# Patient Record
Sex: MALE | NOT HISPANIC OR LATINO
[De-identification: names, ages, dates, MRNs, and addresses within clinical notes are randomized per-mention and may not be internally consistent; named-entity substitution may affect disease eponyms.]

---

## 2018-12-08 PROBLEM — Z00.00 ENCOUNTER FOR PREVENTIVE HEALTH EXAMINATION: Status: ACTIVE | Noted: 2018-12-08

## 2019-01-03 ENCOUNTER — RECORD ABSTRACTING (OUTPATIENT)
Age: 69
End: 2019-01-03

## 2019-01-03 DIAGNOSIS — Z86.39 PERSONAL HISTORY OF OTHER ENDOCRINE, NUTRITIONAL AND METABOLIC DISEASE: ICD-10-CM

## 2019-01-03 DIAGNOSIS — Z83.3 FAMILY HISTORY OF DIABETES MELLITUS: ICD-10-CM

## 2019-01-03 DIAGNOSIS — Z82.49 FAMILY HISTORY OF ISCHEMIC HEART DISEASE AND OTHER DISEASES OF THE CIRCULATORY SYSTEM: ICD-10-CM

## 2019-01-03 DIAGNOSIS — Z82.3 FAMILY HISTORY OF STROKE: ICD-10-CM

## 2019-01-07 ENCOUNTER — APPOINTMENT (OUTPATIENT)
Dept: INTERNAL MEDICINE | Facility: CLINIC | Age: 69
End: 2019-01-07
Payer: MEDICARE

## 2019-01-07 VITALS
BODY MASS INDEX: 25.76 KG/M2 | HEART RATE: 68 BPM | SYSTOLIC BLOOD PRESSURE: 100 MMHG | WEIGHT: 170 LBS | DIASTOLIC BLOOD PRESSURE: 60 MMHG | HEIGHT: 68 IN

## 2019-01-07 DIAGNOSIS — Z78.9 OTHER SPECIFIED HEALTH STATUS: ICD-10-CM

## 2019-01-07 PROCEDURE — 99214 OFFICE O/P EST MOD 30 MIN: CPT | Mod: 25

## 2019-01-07 PROCEDURE — 36415 COLL VENOUS BLD VENIPUNCTURE: CPT

## 2019-01-07 NOTE — ASSESSMENT
[FreeTextEntry1] : Type 2 diabetes mellitus without complications - E11.9 (Primary), Check glu and A1c. Urged to increase efforts at weight loss. Ophtho and foot care discussed. Cont meds discussed with patient he will likely need higher doses of his medications.. Last A1c was better 9.4 but not at goal. He will not have seen endo in 12 months and still has no f/u appt scheduled. Urged f/u as he is not at goal. I discussed with him that his nocturia is likely a result of his high sugar.\par \par Mixed hyperlipidemia - E78.2, Check lipid profile and lft's. Cont meds and low chol diet. Urged to increase physical activity and increase efforts at wt loss. Last LDL was at goal 59\par \par Lisinopril I believe it was started for DM, not htn.\par \par name mild anemia noted last visit. Will check labs but again recommended screening colonoscopy\par \par I strongly recommended he have a screening colonoscopy as he has not had one for "quite some time".\par

## 2019-01-07 NOTE — REVIEW OF SYSTEMS
[Nocturia] : nocturia [Frequency] : frequency [Fever] : no fever [Chills] : no chills [Chest Pain] : no chest pain [Palpitations] : no palpitations [Shortness Of Breath] : no shortness of breath [Cough] : no cough

## 2019-01-07 NOTE — PHYSICAL EXAM
[No Acute Distress] : no acute distress [Well Nourished] : well nourished [Well Developed] : well developed [Well-Appearing] : well-appearing [Normal Sclera/Conjunctiva] : normal sclera/conjunctiva [PERRL] : pupils equal round and reactive to light [EOMI] : extraocular movements intact [Normal Outer Ear/Nose] : the outer ears and nose were normal in appearance [Normal Oropharynx] : the oropharynx was normal [No JVD] : no jugular venous distention [Supple] : supple [No Lymphadenopathy] : no lymphadenopathy [No Respiratory Distress] : no respiratory distress  [Clear to Auscultation] : lungs were clear to auscultation bilaterally [No Accessory Muscle Use] : no accessory muscle use [Normal Rate] : normal rate  [Regular Rhythm] : with a regular rhythm [Normal S1, S2] : normal S1 and S2 [No Murmur] : no murmur heard [No Carotid Bruits] : no carotid bruits [No Varicosities] : no varicosities [No Edema] : there was no peripheral edema [No Extremity Clubbing/Cyanosis] : no extremity clubbing/cyanosis [Soft] : abdomen soft [Non Tender] : non-tender [Non-distended] : non-distended [No HSM] : no HSM [Normal Bowel Sounds] : normal bowel sounds [Normal Posterior Cervical Nodes] : no posterior cervical lymphadenopathy [Normal Anterior Cervical Nodes] : no anterior cervical lymphadenopathy [No CVA Tenderness] : no CVA  tenderness [No Spinal Tenderness] : no spinal tenderness [No Joint Swelling] : no joint swelling [Grossly Normal Strength/Tone] : grossly normal strength/tone [No Rash] : no rash [Normal Gait] : normal gait [Coordination Grossly Intact] : coordination grossly intact [No Focal Deficits] : no focal deficits [Deep Tendon Reflexes (DTR)] : deep tendon reflexes were 2+ and symmetric [Normal Affect] : the affect was normal [Normal Insight/Judgement] : insight and judgment were intact [de-identified] : no xanthelasma

## 2019-01-07 NOTE — HISTORY OF PRESENT ILLNESS
[FreeTextEntry1] : Moved back to [place] [address] [address] and [place]\par here for followup of diabetes and hyperlipidemia\par Still has not seen endo [de-identified] : c/o Non Insulin Dependent Diabetes Mellitus. \par      Denies : Patient denies polyuria, polydipsia and polyphagia . \par      The patient has been diagnosed with diabetes since 1995. The frequency of the monitoring schedule is occasionally. Hypoglycemic episodes are none known. The results of the last Hbg A1c are 9.4 Oct, 9.3 July, 11.3 Mar 2018. Side effects of the medications include none . Compliance with the medical regimen has been good . \par      c/o Hypercholesterolemia Last LDL 59 Oct \par Patient denies myalgias. \par      The lipid profile goals for the patient are LDL less than 100 if not 70mg/dl. Dietary modifications have included reduced fat intake . Exercise modifications have included participation in walking . Response to the medications has been fair. The patient's weight has changed by -7.\par      Signs of abuse or neglect? No. Fall Risk/History of Falling No.

## 2019-01-08 LAB
ALBUMIN SERPL ELPH-MCNC: 4.7 G/DL
ALP BLD-CCNC: 61 U/L
ALT SERPL-CCNC: 26 U/L
ANION GAP SERPL CALC-SCNC: 14 MMOL/L
AST SERPL-CCNC: 43 U/L
BILIRUB SERPL-MCNC: 0.4 MG/DL
BUN SERPL-MCNC: 28 MG/DL
CALCIUM SERPL-MCNC: 9.6 MG/DL
CHLORIDE SERPL-SCNC: 105 MMOL/L
CHOLEST SERPL-MCNC: 118 MG/DL
CHOLEST/HDLC SERPL: 2.7 RATIO
CK SERPL-CCNC: 68 U/L
CO2 SERPL-SCNC: 25 MMOL/L
CREAT SERPL-MCNC: 0.83 MG/DL
FOLATE SERPL-MCNC: >20 NG/ML
GLUCOSE SERPL-MCNC: 127 MG/DL
HBA1C MFR BLD HPLC: 8.4 %
HDLC SERPL-MCNC: 43 MG/DL
IRON SATN MFR SERPL: 18 %
IRON SERPL-MCNC: 65 UG/DL
LDLC SERPL CALC-MCNC: 62 MG/DL
POTASSIUM SERPL-SCNC: 4.6 MMOL/L
PROT SERPL-MCNC: 7 G/DL
SODIUM SERPL-SCNC: 144 MMOL/L
TIBC SERPL-MCNC: 361 UG/DL
TRIGL SERPL-MCNC: 66 MG/DL
UIBC SERPL-MCNC: 296 UG/DL
VIT B12 SERPL-MCNC: 696 PG/ML

## 2019-01-15 ENCOUNTER — RX RENEWAL (OUTPATIENT)
Age: 69
End: 2019-01-15

## 2019-02-06 ENCOUNTER — RX RENEWAL (OUTPATIENT)
Age: 69
End: 2019-02-06

## 2019-02-07 ENCOUNTER — RX RENEWAL (OUTPATIENT)
Age: 69
End: 2019-02-07

## 2019-02-26 ENCOUNTER — APPOINTMENT (OUTPATIENT)
Dept: ENDOCRINOLOGY | Facility: CLINIC | Age: 69
End: 2019-02-26
Payer: MEDICARE

## 2019-02-26 VITALS
HEART RATE: 58 BPM | WEIGHT: 170 LBS | SYSTOLIC BLOOD PRESSURE: 130 MMHG | HEIGHT: 67 IN | DIASTOLIC BLOOD PRESSURE: 78 MMHG | BODY MASS INDEX: 26.68 KG/M2

## 2019-02-26 PROCEDURE — 99213 OFFICE O/P EST LOW 20 MIN: CPT

## 2019-04-04 ENCOUNTER — MEDICATION RENEWAL (OUTPATIENT)
Age: 69
End: 2019-04-04

## 2019-04-08 ENCOUNTER — APPOINTMENT (OUTPATIENT)
Dept: INTERNAL MEDICINE | Facility: CLINIC | Age: 69
End: 2019-04-08

## 2019-04-10 ENCOUNTER — TRANSCRIPTION ENCOUNTER (OUTPATIENT)
Age: 69
End: 2019-04-10

## 2019-04-15 ENCOUNTER — RX RENEWAL (OUTPATIENT)
Age: 69
End: 2019-04-15

## 2019-04-21 NOTE — PHYSICAL EXAM
[Alert] : alert [No Acute Distress] : no acute distress [Well Nourished] : well nourished [Normal Sclera/Conjunctiva] : normal sclera/conjunctiva [Well Developed] : well developed [No Proptosis] : no proptosis [EOMI] : extra ocular movement intact [Normal Oropharynx] : the oropharynx was normal [Thyroid Not Enlarged] : the thyroid was not enlarged [No Thyroid Nodules] : there were no palpable thyroid nodules [No Accessory Muscle Use] : no accessory muscle use [No Respiratory Distress] : no respiratory distress [Normal Rate] : heart rate was normal  [Normal S1, S2] : normal S1 and S2 [Clear to Auscultation] : lungs were clear to auscultation bilaterally [Regular Rhythm] : with a regular rhythm [Pedal Pulses Normal] : the pedal pulses are present [No Edema] : there was no peripheral edema [Normal Bowel Sounds] : normal bowel sounds [Soft] : abdomen soft [Not Tender] : non-tender [Post Cervical Nodes] : posterior cervical nodes [Not Distended] : not distended [Axillary Nodes] : axillary nodes [Anterior Cervical Nodes] : anterior cervical nodes [No Spinal Tenderness] : no spinal tenderness [Normal] : normal and non tender [Spine Straight] : spine straight [No Stigmata of Cushings Syndrome] : no stigmata of cushings syndrome [Normal Gait] : normal gait [Normal Strength/Tone] : muscle strength and tone were normal [No Rash] : no rash [Acanthosis Nigricans] : no acanthosis nigricans [No Tremors] : no tremors [Oriented x3] : oriented to person, place, and time [Normal Reflexes] : deep tendon reflexes were 2+ and symmetric

## 2019-04-21 NOTE — HISTORY OF PRESENT ILLNESS
[FreeTextEntry1] : February 26, 2019\par \par  PCP:   Dr. Abdi Colon\par             Eyes:   saw  [facility] opthal several months\par             GI:  recent colonoscopy - [facility]\par             \par            .\par            CC: Diabetes (~46 yo)\par \par Last A1c 8.4 %\par He would be an excellent candidate for CGM:  Freestyle Colin as he has widely fluctuating blood sugars,\par checks his fingerstick 4 times a day and injects at least 3 shots of insulin day. \par \par Previous notes from [employer] appended below.\par He was a  for [employer] and retired about a year ago.  \par As noted by Dr. Colon, he is now residing in [place].  \par ^He is testing his fingerstick blood sugars at least 4 times a day with his Accu-meter \par Taking Lantus 32 units  at bedtime and\par Humalog 3 units before breakfast and 3 units before dinner if blood glucose is over 150 mg/dl\par           Victoza 1.8  \par            Metformin 1000 BID\par He notes occasional hypoglycemia - especially before breakfast.\par He continues to note wide fluctuations in his sugars.^   \par Recent January 8, 2019 HbA1c was 8.4 % \par \par Impression:  He would be a good candidate for CGM with a Freestyle Colin meter.\par \par Plan:  Refills on Humalog insulin today.  \par Continue same dose of Lantus.  \par \par - \par \par  March 26, 2018\par            .\par            PCP: Moved from [facility] - Dr. Jackelyn Lincoln\par             Eyes \par            .\par            CC: Diabetes\par            .\par            68 yo father of five, reitred from [employer].\par            Diagnosed with Type 2 diabetes about 20 years ago.\par            Oriignally on just oral agents, then insulin added.\par            .\par            Takes Lantus 28 units HS\par            Victoaza 1.8\par            MF 1000 BID\par            Simvastatin 40\par            B12 \par            ASA 81\par            One Touch Ultraa 2\par            .\par            Impression: Apparently diabetes is currently under loose contol.\par            .\par            Plan: Test fingerstick BS 4X a day: fasting and one hour after each meal. May add short acting insulin before meals. \par            To Dermatology b/o finger lesion.\par            Will need local PCP. \par

## 2019-04-22 ENCOUNTER — RX RENEWAL (OUTPATIENT)
Age: 69
End: 2019-04-22

## 2019-04-30 ENCOUNTER — APPOINTMENT (OUTPATIENT)
Dept: INTERNAL MEDICINE | Facility: CLINIC | Age: 69
End: 2019-04-30
Payer: MEDICARE

## 2019-04-30 VITALS
DIASTOLIC BLOOD PRESSURE: 60 MMHG | HEIGHT: 67 IN | SYSTOLIC BLOOD PRESSURE: 110 MMHG | BODY MASS INDEX: 26.68 KG/M2 | WEIGHT: 170 LBS | HEART RATE: 60 BPM

## 2019-04-30 PROCEDURE — 36415 COLL VENOUS BLD VENIPUNCTURE: CPT

## 2019-04-30 PROCEDURE — 99213 OFFICE O/P EST LOW 20 MIN: CPT | Mod: 25

## 2019-04-30 NOTE — HISTORY OF PRESENT ILLNESS
[FreeTextEntry1] : Moved back to [address] and [place]\par here for followup of diabetes and hyperlipidemia\par Has finally seen endo but meds were not changed pending Skin glucometer\par needs Pvax 23 in [place] [de-identified] : c/o Non Insulin Dependent Diabetes Mellitus. \par      Denies : Patient denies polyuria, polydipsia and polyphagia . \par      The patient has been diagnosed with diabetes since 1995. The frequency of the monitoring schedule is occasionally. Hypoglycemic episodes are none known. The results of the last Hbg A1c are 8.4 Jan 2019;  9.4 Oct, 9.3 July, 11.3 Mar 2018. Side effects of the medications include none . Compliance with the medical regimen has been good . \par      c/o Hypercholesterolemia Last LDL 62 Sanjeev\par Patient denies myalgias. \par      The lipid profile goals for the patient are LDL less than 100 if not 70mg/dl. Dietary modifications have included reduced fat intake . Exercise modifications have included participation in walking . Response to the medications has been fair. The patient's weight has CHANGED BY 0.\par      Signs of abuse or neglect? No. Fall Risk/History of Falling No.

## 2019-04-30 NOTE — PHYSICAL EXAM
[No Acute Distress] : no acute distress [Well Nourished] : well nourished [Well Developed] : well developed [Well-Appearing] : well-appearing [Normal Sclera/Conjunctiva] : normal sclera/conjunctiva [PERRL] : pupils equal round and reactive to light [EOMI] : extraocular movements intact [Normal Outer Ear/Nose] : the outer ears and nose were normal in appearance [Normal Oropharynx] : the oropharynx was normal [No JVD] : no jugular venous distention [Supple] : supple [No Lymphadenopathy] : no lymphadenopathy [No Respiratory Distress] : no respiratory distress  [Clear to Auscultation] : lungs were clear to auscultation bilaterally [No Accessory Muscle Use] : no accessory muscle use [Normal Rate] : normal rate  [Regular Rhythm] : with a regular rhythm [Normal S1, S2] : normal S1 and S2 [No Murmur] : no murmur heard [No Carotid Bruits] : no carotid bruits [No Varicosities] : no varicosities [No Edema] : there was no peripheral edema [No Extremity Clubbing/Cyanosis] : no extremity clubbing/cyanosis [Soft] : abdomen soft [Non Tender] : non-tender [Non-distended] : non-distended [No HSM] : no HSM [Normal Bowel Sounds] : normal bowel sounds [Normal Posterior Cervical Nodes] : no posterior cervical lymphadenopathy [Normal Anterior Cervical Nodes] : no anterior cervical lymphadenopathy [No CVA Tenderness] : no CVA  tenderness [No Spinal Tenderness] : no spinal tenderness [No Joint Swelling] : no joint swelling [Grossly Normal Strength/Tone] : grossly normal strength/tone [No Rash] : no rash [Normal Gait] : normal gait [Coordination Grossly Intact] : coordination grossly intact [No Focal Deficits] : no focal deficits [Deep Tendon Reflexes (DTR)] : deep tendon reflexes were 2+ and symmetric [Normal Affect] : the affect was normal [Normal Insight/Judgement] : insight and judgment were intact [de-identified] : no xanthelasma

## 2019-04-30 NOTE — HEALTH RISK ASSESSMENT
[Hepatitis C test offered] : Hepatitis C test offered [ColonoscopyDate] : 04/19 [ColonoscopyComments] : Recommended again to pt [HepatitisCDate] : 04/19

## 2019-04-30 NOTE — ASSESSMENT
[FreeTextEntry1] : Type 2 diabetes mellitus without complications - E11.9 (Primary), Check glu and A1c. Urged to increase efforts at weight loss. Ophtho and foot care discussed. Cont meds discussed with patient he will likely need higher doses of his medications.. Last A1c was better 9.4 but not at goal. [facility] did not change his meds but has ordered two week glucose recorder which pt has not obtained yet.\par \par Mixed hyperlipidemia - E78.2, Check lipid profile and lft's. Cont meds and low chol diet. Urged to increase physical activity and increase efforts at wt loss. Last LDL was at goal 59\par \par Lisinopril I believe it was started for DM, not htn, as bp's are welll within normal limits.\par \par name mild anemia noted last visit. Will check labs but again recommended screening colonoscopy.\par \par D/w pt Hep C testing and he consents\par \par I strongly recommended he have a screening colonoscopy as he has not had one for "quite some time".\par

## 2019-05-01 LAB
ALBUMIN SERPL ELPH-MCNC: 4.6 G/DL
ALP BLD-CCNC: 68 U/L
ALT SERPL-CCNC: 27 U/L
ANION GAP SERPL CALC-SCNC: 17 MMOL/L
AST SERPL-CCNC: 44 U/L
BASOPHILS # BLD AUTO: 0.06 K/UL
BASOPHILS NFR BLD AUTO: 0.5 %
BILIRUB SERPL-MCNC: 0.4 MG/DL
BUN SERPL-MCNC: 25 MG/DL
CALCIUM SERPL-MCNC: 10.3 MG/DL
CHLORIDE SERPL-SCNC: 98 MMOL/L
CHOLEST SERPL-MCNC: 129 MG/DL
CHOLEST/HDLC SERPL: 2.8 RATIO
CO2 SERPL-SCNC: 25 MMOL/L
CREAT SERPL-MCNC: 0.99 MG/DL
EOSINOPHIL # BLD AUTO: 0.34 K/UL
EOSINOPHIL NFR BLD AUTO: 3.1 %
ESTIMATED AVERAGE GLUCOSE: 194 MG/DL
GLUCOSE SERPL-MCNC: 229 MG/DL
HBA1C MFR BLD HPLC: 8.4 %
HCT VFR BLD CALC: 46.3 %
HDLC SERPL-MCNC: 46 MG/DL
HGB BLD-MCNC: 14.6 G/DL
IMM GRANULOCYTES NFR BLD AUTO: 0.2 %
LDLC SERPL CALC-MCNC: 48 MG/DL
LYMPHOCYTES # BLD AUTO: 2.68 K/UL
LYMPHOCYTES NFR BLD AUTO: 24.4 %
MAN DIFF?: NORMAL
MCHC RBC-ENTMCNC: 30.5 PG
MCHC RBC-ENTMCNC: 31.5 GM/DL
MCV RBC AUTO: 96.9 FL
MONOCYTES # BLD AUTO: 0.6 K/UL
MONOCYTES NFR BLD AUTO: 5.5 %
NEUTROPHILS # BLD AUTO: 7.27 K/UL
NEUTROPHILS NFR BLD AUTO: 66.3 %
PLATELET # BLD AUTO: 192 K/UL
POTASSIUM SERPL-SCNC: 5.1 MMOL/L
PROT SERPL-MCNC: 6.8 G/DL
RBC # BLD: 4.78 M/UL
RBC # FLD: 12.9 %
SODIUM SERPL-SCNC: 140 MMOL/L
TRIGL SERPL-MCNC: 174 MG/DL
WBC # FLD AUTO: 10.97 K/UL

## 2019-05-04 LAB
HCV AB SER QL: NONREACTIVE
HCV S/CO RATIO: 0.11 S/CO

## 2019-05-06 ENCOUNTER — TRANSCRIPTION ENCOUNTER (OUTPATIENT)
Age: 69
End: 2019-05-06

## 2019-05-07 ENCOUNTER — RX RENEWAL (OUTPATIENT)
Age: 69
End: 2019-05-07

## 2019-07-12 ENCOUNTER — RX RENEWAL (OUTPATIENT)
Age: 69
End: 2019-07-12

## 2019-07-15 ENCOUNTER — RX RENEWAL (OUTPATIENT)
Age: 69
End: 2019-07-15

## 2019-08-10 ENCOUNTER — RX RENEWAL (OUTPATIENT)
Age: 69
End: 2019-08-10

## 2019-08-12 ENCOUNTER — RX RENEWAL (OUTPATIENT)
Age: 69
End: 2019-08-12

## 2019-08-12 ENCOUNTER — TRANSCRIPTION ENCOUNTER (OUTPATIENT)
Age: 69
End: 2019-08-12

## 2019-08-13 ENCOUNTER — APPOINTMENT (OUTPATIENT)
Dept: ENDOCRINOLOGY | Facility: CLINIC | Age: 69
End: 2019-08-13
Payer: MEDICARE

## 2019-08-13 VITALS
HEIGHT: 67 IN | DIASTOLIC BLOOD PRESSURE: 68 MMHG | SYSTOLIC BLOOD PRESSURE: 110 MMHG | WEIGHT: 168 LBS | HEART RATE: 64 BPM | BODY MASS INDEX: 26.37 KG/M2

## 2019-08-13 PROCEDURE — 99214 OFFICE O/P EST MOD 30 MIN: CPT | Mod: 25

## 2019-08-13 PROCEDURE — 36415 COLL VENOUS BLD VENIPUNCTURE: CPT

## 2019-08-13 NOTE — HISTORY OF PRESENT ILLNESS
[FreeTextEntry1] : August 13, 2019\par \par PCP:  Dr. Abdi Colon\par           Eyes:  Dr. De\par \par \par CC:  Diabetes\par \par ^  Monitoring fingerstick BS at home at least 4 times a day and taking 3 shots of insulin.\par He notes occasional low blood sugar, particularly before breakfast.\par It is likely that his closer attention to diet and activity has decreased his insulin\par requirements.  So he will start to slowly taper the Lantus dose down from 32 units.\par \par Impression:  Based on his information, he still has wide fluctuations in blood glucose, although less so\par than previously.   He would be excellent candidate for CGM with Colin 14 and that is in the works.\par He will have A1c today.    I asked him to return in January and to see Dr. Colon in the interval.  \par \par \par \par February 26, 2019\par \par  PCP:   Dr. Abdi Colon\par             Eyes:   saw  [facility] opthal several months\par             GI:  recent colonoscopy - [facility]\par             \par            .\par            CC: Diabetes (~46 yo)\par \par Last A1c 8.4 %\par He would be an excellent candidate for CGM:  Freestyle Coiln as he has widely fluctuating blood sugars,\par checks his fingerstick 4 times a day and injects at least 3 shots of insulin day. \par \par Previous notes from [employer] appended below.\par He was a  for [employer] and retired about a year ago.  \par As noted by Dr. Colon, he is now residing in [place].  \par ^He is testing his fingerstick blood sugars at least 4 times a day with his Accu-meter \par Taking Lantus 32 units  at bedtime and   +++\par Humalog 3 units before breakfast and 3 units before dinner if blood glucose is over 150 mg/dl\par           Victoza 1.8                ++\par            Metformin 1000 BID   ++\par He notes occasional hypoglycemia - especially before breakfast.\par He continues to note wide fluctuations in his sugars.^   \par Recent January 8, 2019 HbA1c was 8.4 % \par \par Impression:  He would be a good candidate for CGM with a Freestyle Colin meter.\par \par Plan:  Refills on Humalog insulin today.  \par Continue same dose of Lantus.  \par \par - \par \par  March 26, 2018\par            .\par            PCP: Moved from [facility] - Dr. Jackelyn Lnicoln\par             Eyes \par            .\par            CC: Diabetes\par            .\par            68 yo father of five, reitred from [employer].\par            Diagnosed with Type 2 diabetes about 20 years ago.\par            Oriignally on just oral agents, then insulin added.\par            .\par            Takes Lantus 28 units HS\par            Victoaza 1.8\par            MF 1000 BID\par            Simvastatin 40\par            B12 \par            ASA 81\par            One Touch Ultraa 2\par            .\par            Impression: Apparently diabetes is currently under loose contol.\par            .\par            Plan: Test fingerstick BS 4X a day: fasting and one hour after each meal. May add short acting insulin before meals. \par            To Dermatology b/o finger lesion.\par            Will need local PCP. \par

## 2019-08-13 NOTE — ASSESSMENT
[FreeTextEntry1] : &\par Fingerstick BS results discussed.\par Use of CGM - Freestyle Colin reviewed.

## 2019-08-14 LAB
ANION GAP SERPL CALC-SCNC: 12 MMOL/L
BUN SERPL-MCNC: 18 MG/DL
CALCIUM SERPL-MCNC: 10.4 MG/DL
CHLORIDE SERPL-SCNC: 103 MMOL/L
CO2 SERPL-SCNC: 26 MMOL/L
CREAT SERPL-MCNC: 0.79 MG/DL
ESTIMATED AVERAGE GLUCOSE: 180 MG/DL
GLUCOSE SERPL-MCNC: 75 MG/DL
HBA1C MFR BLD HPLC: 7.9 %
POTASSIUM SERPL-SCNC: 5.3 MMOL/L
SODIUM SERPL-SCNC: 141 MMOL/L

## 2019-08-15 LAB
B BURGDOR AB SER-IMP: NEGATIVE
B BURGDOR IGG+IGM SER QL: 0.04 INDEX

## 2019-09-17 ENCOUNTER — TRANSCRIPTION ENCOUNTER (OUTPATIENT)
Age: 69
End: 2019-09-17

## 2019-09-19 ENCOUNTER — APPOINTMENT (OUTPATIENT)
Dept: INTERNAL MEDICINE | Facility: CLINIC | Age: 69
End: 2019-09-19
Payer: MEDICARE

## 2019-09-19 VITALS
BODY MASS INDEX: 26 KG/M2 | DIASTOLIC BLOOD PRESSURE: 60 MMHG | HEART RATE: 68 BPM | WEIGHT: 166 LBS | SYSTOLIC BLOOD PRESSURE: 150 MMHG

## 2019-09-19 PROCEDURE — 99213 OFFICE O/P EST LOW 20 MIN: CPT | Mod: 25

## 2019-09-19 PROCEDURE — 36415 COLL VENOUS BLD VENIPUNCTURE: CPT

## 2019-09-19 PROCEDURE — 90732 PPSV23 VACC 2 YRS+ SUBQ/IM: CPT

## 2019-09-19 PROCEDURE — G0444 DEPRESSION SCREEN ANNUAL: CPT | Mod: 59

## 2019-09-19 PROCEDURE — G0009: CPT

## 2019-09-19 NOTE — ASSESSMENT
[FreeTextEntry1] : Type 2 diabetes mellitus without complications - E11.9 (Primary), Check glu and A1c. Urged to increase efforts at weight loss. Ophtho and foot care discussed. Cont meds discussed with patient he will likely need higher doses of his medications. Continue f/u with endo Hellerman. Last A1c was better 7.9 but not at goal. \par \par Mixed hyperlipidemia - E78.2, Check lipid profile and lft's. Cont meds and low chol diet. Urged to increase physical activity and increase efforts at wt loss. Last LDL was at goal 48\par \par Risks/benefits of Pvax23 vaccine d/w patient and patient understands and accepts.\par \par I strongly recommended he have a screening colonoscopy as he has not had one for "quite some time".\par Also recommended Shingrix vaccine.\par

## 2019-09-19 NOTE — HISTORY OF PRESENT ILLNESS
[FreeTextEntry1] : Moved back to [address] and [place]\par here for followup of diabetes and hyperlipidemia; now using Freestyle to check his sugar up to 20 times a day!\par now exercising regularly every day\par needs Pvax 23 in [place] [de-identified] : c/o Non Insulin Dependent Diabetes Mellitus. \par      Denies : Patient denies polyuria, polydipsia and polyphagia . \par      The patient has been diagnosed with diabetes since 1995. The frequency of the monitoring schedule is occasionally. Hypoglycemic episodes are none known. The results of the last Hbg A1c are 7.9 Aug,  8.4 Jan 2019;  9.4 Oct, 9.3 July, 11.3 Mar 2018. Side effects of the medications include none . Compliance with the medical regimen has been good . \par      c/o Hypercholesterolemia Last LDL 48 May\par Patient denies myalgias. \par      The lipid profile goals for the patient are LDL less than 100 if not 70mg/dl. Dietary modifications have included reduced fat intake . Exercise modifications have included participation in walking . Response to the medications has been fair. The patient's weight has CHANGED BY -2\par      Signs of abuse or neglect? No. Fall Risk/History of Falling No.

## 2019-09-19 NOTE — PHYSICAL EXAM
[No JVD] : no jugular venous distention [Normal] : normal rate, regular rhythm, normal S1 and S2 and no murmur heard [No Carotid Bruits] : no carotid bruits [No Edema] : there was no peripheral edema [de-identified] : no xanthelasmas [de-identified] : no S4

## 2019-09-20 LAB
ALBUMIN SERPL ELPH-MCNC: 4.8 G/DL
ALP BLD-CCNC: 55 U/L
ALT SERPL-CCNC: 28 U/L
ANION GAP SERPL CALC-SCNC: 14 MMOL/L
AST SERPL-CCNC: 51 U/L
BILIRUB SERPL-MCNC: 0.4 MG/DL
BUN SERPL-MCNC: 27 MG/DL
CALCIUM SERPL-MCNC: 9.9 MG/DL
CHLORIDE SERPL-SCNC: 105 MMOL/L
CHOLEST SERPL-MCNC: 116 MG/DL
CHOLEST/HDLC SERPL: 2.4 RATIO
CO2 SERPL-SCNC: 25 MMOL/L
CREAT SERPL-MCNC: 1 MG/DL
GLUCOSE SERPL-MCNC: 163 MG/DL
HDLC SERPL-MCNC: 48 MG/DL
LDLC SERPL CALC-MCNC: 47 MG/DL
POTASSIUM SERPL-SCNC: 5 MMOL/L
PROT SERPL-MCNC: 6.7 G/DL
SODIUM SERPL-SCNC: 144 MMOL/L
TRIGL SERPL-MCNC: 104 MG/DL

## 2019-11-14 ENCOUNTER — RX RENEWAL (OUTPATIENT)
Age: 69
End: 2019-11-14

## 2019-11-14 ENCOUNTER — TRANSCRIPTION ENCOUNTER (OUTPATIENT)
Age: 69
End: 2019-11-14

## 2019-12-03 ENCOUNTER — APPOINTMENT (OUTPATIENT)
Dept: INTERNAL MEDICINE | Facility: CLINIC | Age: 69
End: 2019-12-03
Payer: MEDICARE

## 2019-12-03 VITALS
SYSTOLIC BLOOD PRESSURE: 114 MMHG | DIASTOLIC BLOOD PRESSURE: 50 MMHG | BODY MASS INDEX: 25.37 KG/M2 | HEART RATE: 64 BPM | WEIGHT: 162 LBS

## 2019-12-03 PROCEDURE — 36415 COLL VENOUS BLD VENIPUNCTURE: CPT

## 2019-12-03 PROCEDURE — G0008: CPT

## 2019-12-03 PROCEDURE — 90653 IIV ADJUVANT VACCINE IM: CPT

## 2019-12-03 PROCEDURE — 99213 OFFICE O/P EST LOW 20 MIN: CPT | Mod: 25

## 2019-12-03 PROCEDURE — G0444 DEPRESSION SCREEN ANNUAL: CPT | Mod: 59

## 2019-12-03 NOTE — ASSESSMENT
[FreeTextEntry1] : Type 2 diabetes mellitus without complications - E11.9 (Primary), Check glu and A1c. Urged to increase efforts at weight loss. Ophtho and foot care discussed. Cont meds discussed with patient he will likely need higher doses of his medications. Continue f/u with endo Hellerman. Last A1c was better 7.9 but not at goal. \par \par Mixed hyperlipidemia - E78.2, Check lipid profile and lft's. Cont meds and low chol diet. Urged to increase physical activity and increase efforts at wt loss. Last LDL was at goal 47\par \par Risks/benefits of high-dose flu vaccine d/w patient and patient understands and accepts.\par \par I strongly recommended he have a screening colonoscopy as he has not had one for "quite some time".\par

## 2019-12-03 NOTE — REVIEW OF SYSTEMS
[Frequency] : frequency [Nocturia] : nocturia [Fever] : no fever [Chills] : no chills [Chest Pain] : no chest pain [Palpitations] : no palpitations [Shortness Of Breath] : no shortness of breath [Cough] : no cough

## 2019-12-03 NOTE — HISTORY OF PRESENT ILLNESS
[FreeTextEntry1] : Moved back to [address] and [place]\par here for followup of diabetes and hyperlipidemia; now using Freestyle to check his sugar up to 20 times a day!\par now exercising regularly every day for one hour\par self-decreased Lantus to 16 units\par \par needs Pvax 23 in [place] [de-identified] : c/o Non Insulin Dependent Diabetes Mellitus. \par      Denies : Patient denies polyuria, polydipsia and polyphagia.   The patient's weight has CHANGED BY -4\par      The patient has been diagnosed with diabetes since 1995. The frequency of the monitoring schedule is occasionally. Hypoglycemic episodes are none known. The results of the last Hbg A1c are 7.9 Aug,  8.4 Jan 2019;  9.4 Oct, 9.3 July, 11.3 Mar 2018. Side effects of the medications include none . Compliance with the medical regimen has been good . \par      c/o Hypercholesterolemia Last LDL 47 Sept\par Patient denies myalgias. \par      The lipid profile goals for the patient are LDL less than 100 if not 70mg/dl. Dietary modifications have included reduced fat intake . Exercise modifications have included participation in walking . Response to the medications has been fair.\par      Signs of abuse or neglect? No. Fall Risk/History of Falling No.

## 2019-12-03 NOTE — PHYSICAL EXAM
[No JVD] : no jugular venous distention [No Edema] : there was no peripheral edema [No Carotid Bruits] : no carotid bruits [Normal] : normal rate, regular rhythm, normal S1 and S2 and no murmur heard [de-identified] : no xanthelasmas [de-identified] : no S4

## 2019-12-04 LAB
ALBUMIN SERPL ELPH-MCNC: 4.5 G/DL
ALP BLD-CCNC: 54 U/L
ALT SERPL-CCNC: 25 U/L
ANION GAP SERPL CALC-SCNC: 14 MMOL/L
AST SERPL-CCNC: 47 U/L
BILIRUB SERPL-MCNC: 0.4 MG/DL
BUN SERPL-MCNC: 18 MG/DL
CALCIUM SERPL-MCNC: 9.7 MG/DL
CHLORIDE SERPL-SCNC: 102 MMOL/L
CHOLEST SERPL-MCNC: 112 MG/DL
CHOLEST/HDLC SERPL: 2.3 RATIO
CO2 SERPL-SCNC: 24 MMOL/L
CREAT SERPL-MCNC: 0.87 MG/DL
ESTIMATED AVERAGE GLUCOSE: 163 MG/DL
GLUCOSE SERPL-MCNC: 235 MG/DL
HBA1C MFR BLD HPLC: 7.3 %
HDLC SERPL-MCNC: 48 MG/DL
LDLC SERPL CALC-MCNC: 40 MG/DL
POTASSIUM SERPL-SCNC: 4.9 MMOL/L
PROT SERPL-MCNC: 6.5 G/DL
SODIUM SERPL-SCNC: 140 MMOL/L
TRIGL SERPL-MCNC: 118 MG/DL

## 2019-12-05 ENCOUNTER — RESULT REVIEW (OUTPATIENT)
Age: 69
End: 2019-12-05

## 2019-12-17 ENCOUNTER — RX RENEWAL (OUTPATIENT)
Age: 69
End: 2019-12-17

## 2019-12-18 ENCOUNTER — APPOINTMENT (OUTPATIENT)
Dept: INTERNAL MEDICINE | Facility: CLINIC | Age: 69
End: 2019-12-18

## 2020-01-15 ENCOUNTER — APPOINTMENT (OUTPATIENT)
Dept: ENDOCRINOLOGY | Facility: CLINIC | Age: 70
End: 2020-01-15
Payer: MEDICARE

## 2020-01-15 VITALS
BODY MASS INDEX: 25.9 KG/M2 | SYSTOLIC BLOOD PRESSURE: 118 MMHG | HEART RATE: 68 BPM | HEIGHT: 67 IN | DIASTOLIC BLOOD PRESSURE: 70 MMHG | WEIGHT: 165 LBS

## 2020-01-15 PROCEDURE — 99214 OFFICE O/P EST MOD 30 MIN: CPT

## 2020-02-13 ENCOUNTER — TRANSCRIPTION ENCOUNTER (OUTPATIENT)
Age: 70
End: 2020-02-13

## 2020-02-25 NOTE — PHYSICAL EXAM
[No Acute Distress] : no acute distress [Alert] : alert [Well Developed] : well developed [Well Nourished] : well nourished [Normal Voice/Communication] : normal voice communication [Healthy Appearance] : healthy appearance [PERRL] : pupils equal, round and reactive to light [EOMI] : extra ocular movement intact [No Proptosis] : no proptosis [No Lid Lag] : no lid lag [Normal Oropharynx] : the oropharynx was normal [Thyroid Not Enlarged] : the thyroid was not enlarged [No Thyroid Nodules] : there were no palpable thyroid nodules [No Respiratory Distress] : no respiratory distress [Normal Rate and Effort] : normal respiratory rhythm and effort [No Accessory Muscle Use] : no accessory muscle use [Normal S1, S2] : normal S1 and S2 [Normal Rate] : heart rate was normal  [Regular Rhythm] : with a regular rhythm [No Edema] : there was no peripheral edema [No Stigmata of Cushings Syndrome] : no stigmata of cushings syndrome [Spine Straight] : spine straight [Normal Gait] : normal gait [No Involuntary Movements] : no involuntary movements were seen [No Tremors] : no tremors [Oriented x3] : oriented to person, place, and time [Normal Affect] : the affect was normal [Normal Insight/Judgement] : insight and judgment were intact [Normal Mood] : the mood was normal [Acanthosis Nigricans] : no acanthosis nigricans

## 2020-02-25 NOTE — ASSESSMENT
[FreeTextEntry1] : ~\par Trajectory of blood glucose and A1c is in good direction.\par Reviewed strategies to blunt rise of blood glucose after meals even more than has\par now been achieved.

## 2020-02-25 NOTE — HISTORY OF PRESENT ILLNESS
[FreeTextEntry1] :  Sanjeev 15, 2020       Allscripts broken\par \par PCP:  Dr. Abdi Colon\par           Eyes:  Dr. De - needs to see\par           GI: [facility] - saw about a year ago\par \par \par CC:  Diabetes (~age 47)\par \par December 3, 2019  A1c 7.3 % (had been 8.4 in April 2019)\par Monitoring blood sugars at least 4X a day.\par Using Colin 14 CGM which he reports has been very helpful.\par Data reviewed still shows some spikes after meals\par He has been able to lower dose of Lantus from 32 units in PM, now down to about\par 16 units.\par Also remains on Victoza.\par ^Humalog at least twice a day before meals also assists in blunting rise in sugar.^\par ^Injects insulin 3X a day.^\par \par Impression:  Has good understanding of needed strategies to continue to control diabetes.\par Plans to see ophthalmology.\par ROV in May or June\par \par \par \par August 13, 2019\par \par PCP:  Dr. Abdi Colon\par           Eyes:  Dr. De\par \par \par CC:  Diabetes\par \par ^  Monitoring fingerstick BS at home at least 4 times a day and taking 3 shots of insulin.\par He notes occasional low blood sugar, particularly before breakfast.\par It is likely that his closer attention to diet and activity has decreased his insulin\par requirements.  So he will start to slowly taper the Lantus dose down from 32 units.\par \par Impression:  Based on his information, he still has wide fluctuations in blood glucose, although less so\par than previously.   He would be excellent candidate for CGM with Colin 14 and that is in the works.\par He will have A1c today.    I asked him to return in January and to see Dr. Colon in the interval.  \par \par \par \par February 26, 2019\par \par  PCP:   Dr. Abdi Colon\par             Eyes:   saw  [facility] opthal several months\par             GI:  recent colonoscopy - [facility]\par             \par            .\par            CC: Diabetes (~48 yo)\par \par Last A1c 8.4 %\par He would be an excellent candidate for CGM:  Freestyle Colin as he has widely fluctuating blood sugars,\par checks his fingerstick 4 times a day and injects at least 3 shots of insulin day. \par \par Previous notes from [employer] appended below.\par He was a  for [employer] and retired about a year ago.  \par As noted by Dr. Colon, he is now residing in [place].  \par ^He is testing his fingerstick blood sugars at least 4 times a day with his Accu-meter \par Taking Lantus 32 units  at bedtime and   +++\par Humalog 3 units before breakfast and 3 units before dinner if blood glucose is over 150 mg/dl\par           Victoza 1.8                ++\par            Metformin 1000 BID   ++\par He notes occasional hypoglycemia - especially before breakfast.\par He continues to note wide fluctuations in his sugars.^   \par Recent January 8, 2019 HbA1c was 8.4 % \par \par Impression:  He would be a good candidate for CGM with a Freestyle Colin meter.\par \par Plan:  Refills on Humalog insulin today.  \par Continue same dose of Lantus.  \par \par - \par \par  March 26, 2018\par            .\par            PCP: Moved from [facility] - Dr. 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 May add short acting insulin before meals. \par            To Dermatology b/o finger lesion.\par            Will need local PCP. \par

## 2020-03-02 ENCOUNTER — TRANSCRIPTION ENCOUNTER (OUTPATIENT)
Age: 70
End: 2020-03-02

## 2020-03-04 ENCOUNTER — APPOINTMENT (OUTPATIENT)
Dept: INTERNAL MEDICINE | Facility: CLINIC | Age: 70
End: 2020-03-04

## 2020-03-26 RX ORDER — PEN NEEDLE, DIABETIC 29 G X1/2"
32G X 4 MM NEEDLE, DISPOSABLE MISCELLANEOUS
Qty: 200 | Refills: 3 | Status: ACTIVE | COMMUNITY
Start: 2020-03-26 | End: 1900-01-01

## 2020-06-09 ENCOUNTER — RX RENEWAL (OUTPATIENT)
Age: 70
End: 2020-06-09

## 2020-07-12 ENCOUNTER — RX RENEWAL (OUTPATIENT)
Age: 70
End: 2020-07-12

## 2020-10-22 ENCOUNTER — APPOINTMENT (OUTPATIENT)
Dept: INTERNAL MEDICINE | Facility: CLINIC | Age: 70
End: 2020-10-22
Payer: MEDICARE

## 2020-10-22 VITALS
WEIGHT: 156 LBS | HEART RATE: 66 BPM | SYSTOLIC BLOOD PRESSURE: 120 MMHG | HEIGHT: 67 IN | BODY MASS INDEX: 24.48 KG/M2 | DIASTOLIC BLOOD PRESSURE: 80 MMHG

## 2020-10-22 PROCEDURE — G0442 ANNUAL ALCOHOL SCREEN 15 MIN: CPT | Mod: 59

## 2020-10-22 PROCEDURE — 36415 COLL VENOUS BLD VENIPUNCTURE: CPT

## 2020-10-22 PROCEDURE — 93000 ELECTROCARDIOGRAM COMPLETE: CPT | Mod: 59

## 2020-10-22 PROCEDURE — 99215 OFFICE O/P EST HI 40 MIN: CPT | Mod: 25

## 2020-10-22 NOTE — REVIEW OF SYSTEMS
[Negative] : Respiratory [Fever] : no fever [Chills] : no chills [Hearing Loss] : no hearing loss [Chest Pain] : no chest pain [Palpitations] : no palpitations [Abdominal Pain] : no abdominal pain [Nausea] : no nausea [Vomiting] : no vomiting [Heartburn] : no heartburn [Dysuria] : no dysuria [Hematuria] : no hematuria [Muscle Weakness] : no muscle weakness [Skin Rash] : no skin rash [Headache] : no headache [Dizziness] : no dizziness [Memory Loss] : no memory loss [Anxiety] : no anxiety [Depression] : no depression [Easy Bleeding] : no easy bleeding [FreeTextEntry3] : last eye exam 2 yrs [FreeTextEntry8] : no disease; + stone long ago

## 2020-10-22 NOTE — HEALTH RISK ASSESSMENT
[No] : In the past 12 months have you used drugs other than those required for medical reasons? No [No falls in past year] : Patient reported no falls in the past year [Audit-CScore] : 0

## 2020-10-22 NOTE — HISTORY OF PRESENT ILLNESS
[FreeTextEntry1] : here for cpe and f/u of DM, chol; very non-compliant with f/u, meds, care\par has not had labs checked in 10 months; stopped lisinopril and simvastatin on own; has not had DM eye exam in >2 yrs\par  [de-identified] : losing weight in covid era\par c/o Non Insulin Dependent Diabetes Mellitus. \par      Denies : Patient denies polyuria, polydipsia and polyphagia.   The patient's weight has CHANGED BY -9\par      The patient has been diagnosed with diabetes since 1995. The frequency of the monitoring schedule is occasionally. Hypoglycemic episodes are none known. The results of the last Hbg A1c are 7.3 Dec, 7.9 Aug,  8.4 Jan 2019;  9.4 Oct, 9.3 July, 11.3 Mar 2018. Side effects of the medications include none . Compliance with the medical regimen has been good . \par      c/o Hypercholesterolemia Last LDL 40 Dec\par Patient denies myalgias. \par      The lipid profile goals for the patient are LDL less than 100 if not 70mg/dl. Dietary modifications have included reduced fat intake . Exercise modifications have included participation in walking . Response to the medications has been fair.\par      Signs of abuse or neglect? No. Fall Risk/History of Falling No.

## 2020-10-22 NOTE — PHYSICAL EXAM
[No Acute Distress] : no acute distress [Well Nourished] : well nourished [Well Developed] : well developed [Well-Appearing] : well-appearing [Normal Sclera/Conjunctiva] : normal sclera/conjunctiva [PERRL] : pupils equal round and reactive to light [EOMI] : extraocular movements intact [Normal Outer Ear/Nose] : the outer ears and nose were normal in appearance [Normal Oropharynx] : the oropharynx was normal [No JVD] : no jugular venous distention [No Lymphadenopathy] : no lymphadenopathy [Supple] : supple [Thyroid Normal, No Nodules] : the thyroid was normal and there were no nodules present [No Respiratory Distress] : no respiratory distress  [No Accessory Muscle Use] : no accessory muscle use [Clear to Auscultation] : lungs were clear to auscultation bilaterally [Normal Rate] : normal rate  [Regular Rhythm] : with a regular rhythm [Normal S1, S2] : normal S1 and S2 [No Murmur] : no murmur heard [No Carotid Bruits] : no carotid bruits [No Abdominal Bruit] : a ~M bruit was not heard ~T in the abdomen [No Varicosities] : no varicosities [Pedal Pulses Present] : the pedal pulses are present [No Edema] : there was no peripheral edema [No Palpable Aorta] : no palpable aorta [No Extremity Clubbing/Cyanosis] : no extremity clubbing/cyanosis [Soft] : abdomen soft [Non Tender] : non-tender [Non-distended] : non-distended [No Masses] : no abdominal mass palpated [No HSM] : no HSM [Normal Bowel Sounds] : normal bowel sounds [Normal Posterior Cervical Nodes] : no posterior cervical lymphadenopathy [Normal Anterior Cervical Nodes] : no anterior cervical lymphadenopathy [No CVA Tenderness] : no CVA  tenderness [No Spinal Tenderness] : no spinal tenderness [No Joint Swelling] : no joint swelling [Grossly Normal Strength/Tone] : grossly normal strength/tone [No Rash] : no rash [Coordination Grossly Intact] : coordination grossly intact [No Focal Deficits] : no focal deficits [Normal Gait] : normal gait [Normal Affect] : the affect was normal [Normal Insight/Judgement] : insight and judgment were intact [de-identified] : no S4 [de-identified] : + reducible ventral hernia [de-identified] : nnormal color vision; 1+ DTRs

## 2020-10-22 NOTE — ASSESSMENT
[FreeTextEntry1] : Type 2 diabetes mellitus without complications - E11.9 (Primary), Check glu and A1c. Urged to increase efforts at weight loss. Ophtho and foot care discussed AGAIN. Cont meds discussed with patient he will likely need higher doses of his medications. Continue f/u with endo Hellerman. Last A1c was better 7.3 but not at goal. \par \par Mixed hyperlipidemia - E78.2, Check lipid profile and lft's. Cont meds and low chol diet. Urged to increase physical activity and increase efforts at wt loss. Last LDL was at goal 40\par \par Got flu vaccine about 10 days\par \par \par

## 2020-10-24 LAB
ALBUMIN SERPL ELPH-MCNC: 4.4 G/DL
ALP BLD-CCNC: 69 U/L
ALT SERPL-CCNC: 24 U/L
ANION GAP SERPL CALC-SCNC: 14 MMOL/L
APPEARANCE: CLEAR
AST SERPL-CCNC: 47 U/L
BASOPHILS # BLD AUTO: 0.05 K/UL
BASOPHILS NFR BLD AUTO: 0.6 %
BILIRUB SERPL-MCNC: 0.2 MG/DL
BILIRUBIN URINE: NEGATIVE
BLOOD URINE: NEGATIVE
BUN SERPL-MCNC: 23 MG/DL
CALCIUM SERPL-MCNC: 10 MG/DL
CHLORIDE SERPL-SCNC: 102 MMOL/L
CHOLEST SERPL-MCNC: 190 MG/DL
CO2 SERPL-SCNC: 27 MMOL/L
COLOR: YELLOW
CREAT SERPL-MCNC: 0.86 MG/DL
EOSINOPHIL # BLD AUTO: 0.55 K/UL
EOSINOPHIL NFR BLD AUTO: 6.1 %
ESTIMATED AVERAGE GLUCOSE: 157 MG/DL
GLUCOSE QUALITATIVE U: NEGATIVE
GLUCOSE SERPL-MCNC: 204 MG/DL
HBA1C MFR BLD HPLC: 7.1 %
HCT VFR BLD CALC: 41.1 %
HDLC SERPL-MCNC: 68 MG/DL
HGB BLD-MCNC: 13.4 G/DL
IMM GRANULOCYTES NFR BLD AUTO: 0.2 %
KETONES URINE: NEGATIVE
LDLC SERPL CALC-MCNC: 96 MG/DL
LEUKOCYTE ESTERASE URINE: NEGATIVE
LYMPHOCYTES # BLD AUTO: 2.3 K/UL
LYMPHOCYTES NFR BLD AUTO: 25.4 %
MAN DIFF?: NORMAL
MCHC RBC-ENTMCNC: 31.3 PG
MCHC RBC-ENTMCNC: 32.6 GM/DL
MCV RBC AUTO: 96 FL
MONOCYTES # BLD AUTO: 0.6 K/UL
MONOCYTES NFR BLD AUTO: 6.6 %
NEUTROPHILS # BLD AUTO: 5.54 K/UL
NEUTROPHILS NFR BLD AUTO: 61.1 %
NITRITE URINE: NEGATIVE
NONHDLC SERPL-MCNC: 122 MG/DL
PH URINE: 6
PLATELET # BLD AUTO: 164 K/UL
POTASSIUM SERPL-SCNC: 4.6 MMOL/L
PROT SERPL-MCNC: 6.6 G/DL
PROTEIN URINE: NEGATIVE
RBC # BLD: 4.28 M/UL
RBC # FLD: 12.8 %
SODIUM SERPL-SCNC: 143 MMOL/L
SPECIFIC GRAVITY URINE: 1.02
TRIGL SERPL-MCNC: 131 MG/DL
UROBILINOGEN URINE: NORMAL
WBC # FLD AUTO: 9.06 K/UL

## 2020-10-26 ENCOUNTER — NON-APPOINTMENT (OUTPATIENT)
Age: 70
End: 2020-10-26

## 2020-10-26 ENCOUNTER — APPOINTMENT (OUTPATIENT)
Dept: INTERNAL MEDICINE | Facility: CLINIC | Age: 70
End: 2020-10-26

## 2020-11-03 ENCOUNTER — APPOINTMENT (OUTPATIENT)
Dept: ENDOCRINOLOGY | Facility: CLINIC | Age: 70
End: 2020-11-03
Payer: MEDICARE

## 2020-11-03 PROCEDURE — 99214 OFFICE O/P EST MOD 30 MIN: CPT | Mod: 95

## 2020-11-03 NOTE — ASSESSMENT
[FreeTextEntry1] : Blood sugar control is improving.\par Striving for A1c under 7 % per ADA guidelines.

## 2020-11-03 NOTE — HISTORY OF PRESENT ILLNESS
[Home] : at home, [unfilled] , at the time of the visit. [Medical Office: ([facility])___] : at the medical office located in  [Verbal consent obtained from patient] : the patient, [unfilled] [FreeTextEntry1] : Nov 03, 2020       Android    [employer]\par \par  PCP:  Dr. Abdi Colon\par           Eyes:  Dr. De - needs to see\par           GI: [facility] - saw about a year ago\par \par \par CC:  Diabetes (~age 47)\par \par Was out of town  and A1c went down to 7.1 % from 7.3 in Dec 2019, 7.9 in Augu 2019 and 8.4 in April 2019\par He attributes much of the improvement to multiple tests he can do with Freestyle Colin 14, more than 4X a day.\par \par Has been able to lower Lantus to 12 units a day HS.\par Humalog by sliding scale. 2- 3 times a day, none if glucose   \par \par Impression:  BS at visit to Dr. Colon was over 200 mg/dl after a sandwich; however, A1c trajectory is improving.\par \par Plan:   Same Rx and ROV in February 2021.        \par \par \par \par \par \par Sanjeev 15, 2020       Allscripts broken\par \par PCP:  Dr. Abdi Colon\par           Eyes:  Dr. De - needs to see\par           GI: [facility] - saw about a year ago\par \par \par CC:  Diabetes (~age 47)\par \par December 3, 2019  A1c 7.3 % (had been 8.4 in April 2019)\par Monitoring blood sugars at least 4X a day.\par Using Colin 14 CGM which he reports has been very helpful.\par Data reviewed still shows some spikes after meals\par He has been able to lower dose of Lantus from 32 units in PM, now down to about\par 16 units.\par Also remains on Victoza.\par ^Humalog at least twice a day before meals also assists in blunting rise in sugar.^\par ^Injects insulin 3X a day.^\par \par Impression:  Has good understanding of needed strategies to continue to control diabetes.\par Plans to see ophthalmology.\par ROV in May or June\par \par \par \par August 13, 2019\par \par PCP:  Dr. Abdi Colon\par           Eyes:  Dr. De\par \par \par CC:  Diabetes\par \par ^  Monitoring fingerstick BS at home at least 4 times a day and taking 3 shots of insulin.\par He notes occasional low blood sugar, particularly before breakfast.\par It is likely that his closer attention to diet and activity has decreased his insulin\par requirements.  So he will start to slowly taper the Lantus dose down from 32 units.\par \par Impression:  Based on his information, he still has wide fluctuations in blood glucose, although less so\par than previously.   He would be excellent candidate for CGM with Colin 14 and that is in the works.\par He will have A1c today.    I asked him to return in January and to see Dr. Colon in the interval.  \par \par \par \par February 26, 2019\par \par  PCP:   Dr. Abdi Colon\par             Eyes:   saw  [facility] opthal several months\par             GI:  recent colonoscopy - [facility]\par             \par            .\par            CC: Diabetes (~48 yo)\par \par Last A1c 8.4 %\par He would be an excellent candidate for CGM:  Freestyle Colin as he has widely fluctuating blood sugars,\par checks his fingerstick 4 times a day and injects at least 3 shots of insulin day. \par \par Previous notes from [employer] appended below.\par He was a  for [employer] and retired about a year ago.  \par As noted by Dr. Colon, he is now residing in [place].  \par ^He is testing his fingerstick blood sugars at least 4 times a day with his Accu-meter \par Taking Lantus 32 units  at bedtime and   +++\par Humalog 3 units before breakfast and 3 units before dinner if blood glucose is over 150 mg/dl\par           Victoza 1.8                ++\par            Metformin 1000 BID   ++\par He notes occasional hypoglycemia - especially before breakfast.\par He continues to note wide fluctuations in his sugars.^   \par Recent January 8, 2019 HbA1c was 8.4 % \par \par Impression:  He would be a good candidate for CGM with a Freestyle Colin meter.\par \par Plan:  Refills on Humalog insulin today.  \par Continue same dose of Lantus.  \par \par - \par \par  March 26, 2018\par            .\par            PCP: Moved from [facility] - Dr. 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 May add short acting insulin before meals. \par            To Dermatology b/o finger lesion.\par            Will need local PCP. \par

## 2021-01-19 ENCOUNTER — NON-APPOINTMENT (OUTPATIENT)
Age: 71
End: 2021-01-19

## 2021-01-21 ENCOUNTER — APPOINTMENT (OUTPATIENT)
Dept: INTERNAL MEDICINE | Facility: CLINIC | Age: 71
End: 2021-01-21
Payer: MEDICARE

## 2021-01-21 VITALS
HEART RATE: 68 BPM | HEIGHT: 67 IN | WEIGHT: 156 LBS | DIASTOLIC BLOOD PRESSURE: 50 MMHG | BODY MASS INDEX: 24.48 KG/M2 | SYSTOLIC BLOOD PRESSURE: 100 MMHG

## 2021-01-21 PROCEDURE — 99213 OFFICE O/P EST LOW 20 MIN: CPT | Mod: 25

## 2021-01-21 PROCEDURE — G0444 DEPRESSION SCREEN ANNUAL: CPT | Mod: 59

## 2021-01-21 PROCEDURE — 36415 COLL VENOUS BLD VENIPUNCTURE: CPT

## 2021-01-21 NOTE — ASSESSMENT
[FreeTextEntry1] : Type 2 diabetes mellitus without complications - E11.9 (Primary), Check glu and A1c. Urged to increase efforts at weight loss. Ophtho and foot care discussed AGAIN. Cont meds discussed with patient he will likely need higher doses of his medications. Continue f/u with endo Hellerman. Last A1c was better 7.1 but not at goal. \par \par Mixed hyperlipidemia - E78.2, Check lipid profile and lft's. Cont meds and low chol diet. Urged to increase physical activity and increase efforts at wt loss. Last LDL was at goal 96\par \par \par \par \par

## 2021-01-21 NOTE — PHYSICAL EXAM
[No JVD] : no jugular venous distention [Normal] : normal rate, regular rhythm, normal S1 and S2 and no murmur heard [de-identified] : no S4

## 2021-01-21 NOTE — HISTORY OF PRESENT ILLNESS
[FreeTextEntry1] : Living at [address] and [facility]\par here for followup of diabetes and hyperlipidemia; now using Freestyle to check his sugar up to 20 times a day!\par now exercising regularly every day for one hour\par got Moderna yesterday\par self-decreased Lantus to 16 units\par \par needs Pvax 23 in JU[place] [de-identified] : c/o Non Insulin Dependent Diabetes Mellitus. \par      Denies : Patient denies polyuria, polydipsia and polyphagia.   The patient's weight has CHANGED BY -9\par      The patient has been diagnosed with diabetes since 1995. The frequency of the monitoring schedule is frequently using Freestyle. Hypoglycemic episodes are none known. The results of the last Hbg A1c are 7.1 Oct, 7.3 Dec, 7.9 Aug,  8.4 Jan 2019;  9.4 Oct, 9.3 July, 11.3 Mar 2018. Side effects of the medications include none . Compliance with the medical regimen has been good . \par      c/o Hypercholesterolemia Last LDL 96 Oct\par Patient denies myalgias. \par      The lipid profile goals for the patient are LDL less than 100 if not 70mg/dl. Dietary modifications have included reduced fat intake . Exercise modifications have included participation in walking . Response to the medications has been fair.\par      Signs of abuse or neglect? No. Fall Risk/History of Falling No.

## 2021-01-21 NOTE — REVIEW OF SYSTEMS
[Fever] : no fever [Chills] : no chills [Chest Pain] : no chest pain [Palpitations] : no palpitations [Headache] : no headache [Dizziness] : no dizziness

## 2021-01-23 LAB
ALBUMIN SERPL ELPH-MCNC: 4.9 G/DL
ALP BLD-CCNC: 66 U/L
ALT SERPL-CCNC: 26 U/L
ANION GAP SERPL CALC-SCNC: 14 MMOL/L
AST SERPL-CCNC: 52 U/L
BILIRUB DIRECT SERPL-MCNC: 0.1 MG/DL
BILIRUB INDIRECT SERPL-MCNC: 0.2 MG/DL
BILIRUB SERPL-MCNC: 0.4 MG/DL
BUN SERPL-MCNC: 28 MG/DL
CALCIUM SERPL-MCNC: 9.9 MG/DL
CHLORIDE SERPL-SCNC: 103 MMOL/L
CHOLEST SERPL-MCNC: 130 MG/DL
CO2 SERPL-SCNC: 24 MMOL/L
CREAT SERPL-MCNC: 1.02 MG/DL
ESTIMATED AVERAGE GLUCOSE: 163 MG/DL
GLUCOSE SERPL-MCNC: 77 MG/DL
HBA1C MFR BLD HPLC: 7.3 %
HDLC SERPL-MCNC: 59 MG/DL
LDLC SERPL CALC-MCNC: 60 MG/DL
NONHDLC SERPL-MCNC: 72 MG/DL
POTASSIUM SERPL-SCNC: 5.6 MMOL/L
PROT SERPL-MCNC: 7.1 G/DL
SODIUM SERPL-SCNC: 141 MMOL/L
TRIGL SERPL-MCNC: 58 MG/DL

## 2021-02-11 ENCOUNTER — APPOINTMENT (OUTPATIENT)
Dept: ENDOCRINOLOGY | Facility: CLINIC | Age: 71
End: 2021-02-11
Payer: MEDICARE

## 2021-02-11 VITALS
WEIGHT: 158 LBS | OXYGEN SATURATION: 98 % | HEIGHT: 67 IN | BODY MASS INDEX: 24.8 KG/M2 | DIASTOLIC BLOOD PRESSURE: 76 MMHG | HEART RATE: 64 BPM | SYSTOLIC BLOOD PRESSURE: 110 MMHG

## 2021-02-11 PROCEDURE — 99214 OFFICE O/P EST MOD 30 MIN: CPT

## 2021-02-11 NOTE — HISTORY OF PRESENT ILLNESS
[FreeTextEntry1] : Feb 11, 2021    in person\par \par  PCP:  Dr. Abdi Colon\par           Eyes:  Dr. De - saw recently \par           GI: [facility] - up to date\par \par \par CC:  Diabetes (~age 47)\par \par Jan 23, 2021 A1c 7.3 %  (lower when he was in [place])    Now less active in [place] apartment.  \par On Victoza 1.8 daily\par Lantus 12 units HS \par metformin 1000 BID\par Humalog BID - TID AC by sliding scale\par \par Continues to find the Freestyle Colin 14 very helpful\par \par Data reviewed.   If he has chili over rice, BS goes up to 250 mg/dl\par \par Impression:  Working hard to control sugars.\par He has good understanding of relatin between his diet and resultsing sugars.\par \par Plan:  Reviewed ADA gudielines.\par Same Rx\par \par \par \par \par \par \par Nov 03, 2020       Android    [employer]\par \par  PCP:  Dr. Abdi Colon\par           Eyes:  Dr. De - needs to see\par           GI: [facility] - saw about a year ago\par \par \par CC:  Diabetes (~age 47)\par \par Was out of town  and A1c went down to 7.1 % from 7.3 in Dec 2019, 7.9 in Augu 2019 and 8.4 in April 2019\par He attributes much of the improvement to multiple tests he can do with Freestyle Colin 14, more than 4X a day.\par \par Has been able to lower Lantus to 12 units a day HS.\par Humalog by sliding scale. 2- 3 times a day, none if glucose   \par \par Impression:  BS at visit to Dr. Colon was over 200 mg/dl after a sandwich; however, A1c trajectory is improving.\par \par Plan:   Same Rx and ROV in February 2021.        \par \par \par \par \par \par Sanjeev 15, 2020       Allscripts broken\par \par PCP:  Dr. Abdi Colon\par           Eyes:  Dr. De - needs to see\par           GI: [facility] - saw about a year ago\par \par \par CC:  Diabetes (~age 47)\par \par December 3, 2019  A1c 7.3 % (had been 8.4 in April 2019)\par Monitoring blood sugars at least 4X a day.\par Using Colin 14 CGM which he reports has been very helpful.\par Data reviewed still shows some spikes after meals\par He has been able to lower dose of Lantus from 32 units in PM, now down to about\par 16 units.\par Also remains on Victoza.\par ^Humalog at least twice a day before meals also assists in blunting rise in sugar.^\par ^Injects insulin 3X a day.^\par \par Impression:  Has good understanding of needed strategies to continue to control diabetes.\par Plans to see ophthalmology.\par ROV in May or June\par \par \par \par August 13, 2019\par \par PCP:  Dr. Abdi Colon\par           Eyes:  Dr. De\par \par \par CC:  Diabetes\par \par ^  Monitoring fingerstick BS at home at least 4 times a day and taking 3 shots of insulin.\par He notes occasional low blood sugar, particularly before breakfast.\par It is likely that his closer attention to diet and activity has decreased his insulin\par requirements.  So he will start to slowly taper the Lantus dose down from 32 units.\par \par Impression:  Based on his information, he still has wide fluctuations in blood glucose, although less so\par than previously.   He would be excellent candidate for CGM with Colin 14 and that is in the works.\par He will have A1c today.    I asked him to return in January and to see Dr. Colon in the interval.  \par \par \par \par February 26, 2019\par \par  PCP:   Dr. Abdi Colon\par             Eyes:   saw  [facility] opthal several months\par             GI:  recent colonoscopy - [facility]\par             \par            .\par            CC: Diabetes (~46 yo)\par \par Last A1c 8.4 %\par He would be an excellent candidate for CGM:  Freestyle Colin as he has widely fluctuating blood sugars,\par checks his fingerstick 4 times a day and injects at least 3 shots of insulin day. \par \par Previous notes from [employer] appended below.\par He was a  for [employer] and retired about a year ago.  \par As noted by Dr. Colon, he is now residing in [place].  \par ^He is testing his fingerstick blood sugars at least 4 times a day with his Accu-meter \par Taking Lantus 32 units  at bedtime and   +++\par Humalog 3 units before breakfast and 3 units before dinner if blood glucose is over 150 mg/dl\par           Victoza 1.8                ++\par            Metformin 1000 BID   ++\par He notes occasional hypoglycemia - especially before breakfast.\par He continues to note wide fluctuations in his sugars.^   \par Recent January 8, 2019 HbA1c was 8.4 % \par \par Impression:  He would be a good candidate for CGM with a Freestyle Colin meter.\par \par Plan:  Refills on Humalog insulin today.  \par Continue same dose of Lantus.  \par \par - \par \par  March 26, 2018\par            .\par            PCP: Moved from [facility] - Dr. 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 May add short acting insulin before meals. \par            To Dermatology b/o finger lesion.\par            Will need local PCP. \par

## 2021-02-11 NOTE — ASSESSMENT
[FreeTextEntry1] : BS and A1c not yet optimized.\par ADA Guidelines advise striving for A1c under 7% and peak after meal BS under 180.\par Patient has good understanding of strategies to achieve these goals.\par He may be able to get insulin at lower cost than Lantus and may be able to\par obtain GLP-1 agonist at lower cost than Victoza and he will look into that.  \par Suggested ROV in 4-5 months\par

## 2021-04-22 ENCOUNTER — APPOINTMENT (OUTPATIENT)
Dept: INTERNAL MEDICINE | Facility: CLINIC | Age: 71
End: 2021-04-22
Payer: MEDICARE

## 2021-04-22 VITALS
BODY MASS INDEX: 24.8 KG/M2 | DIASTOLIC BLOOD PRESSURE: 60 MMHG | WEIGHT: 158 LBS | HEIGHT: 67 IN | HEART RATE: 68 BPM | SYSTOLIC BLOOD PRESSURE: 110 MMHG

## 2021-04-22 PROCEDURE — 36415 COLL VENOUS BLD VENIPUNCTURE: CPT

## 2021-04-22 PROCEDURE — 99214 OFFICE O/P EST MOD 30 MIN: CPT | Mod: 25

## 2021-04-22 NOTE — HISTORY OF PRESENT ILLNESS
[FreeTextEntry1] : Living at [address] and [facility]\par here for followup of diabetes and hyperlipidemia\par now exercising regularly every day for one hour\par c/o nocturia, frequency, weaker urinary stream\par got Moderna yesterday\par self-decreased Lantus to 16 units\par \par needs Pvax 23 in JUly [de-identified] : c/o Non Insulin Dependent Diabetes Mellitus. \par      Denies : Patient denies polyuria, polydipsia and polyphagia.   The patient's weight has CHANGED BY 0\par      The patient has been diagnosed with diabetes since 1995. The frequency of the monitoring schedule is frequently using Freestyle. Hypoglycemic episodes are none known. The results of the last Hbg A1c are 7.3 Jan 2021; 7.1 Oct, 7.3 Dec, 7.9 Aug,  8.4 Jan 2019;  9.4 Oct, 9.3 July, 11.3 Mar 2018. Side effects of the medications include NONE. Compliance with the medical regimen has been good . \par      c/o Hypercholesterolemia Last LDL 60 Sanjeev\par Patient denies myalgias. \par      The lipid profile goals for the patient are LDL less than 100 if not 70mg/dl. Dietary modifications have included reduced fat intake . Exercise modifications have included participation in walking . Response to the medications has been fair.\par      Signs of abuse or neglect? No. Fall Risk/History of Falling No.

## 2021-04-22 NOTE — ASSESSMENT
[FreeTextEntry1] : Type 2 diabetes mellitus without complications - E11.9 (Primary), Check glu and A1c. Urged to increase efforts at weight loss. Ophtho and foot care discussed AGAIN. Cont meds discussed with patient he will likely need higher doses of his medications. Continue f/u with endo Hellerman. Last A1c was NOT AT GOAL 7.3\par \par Mixed hyperlipidemia - E78.2, Check lipid profile and lft's. Cont meds and low chol diet. Urged to increase physical activity and increase efforts at wt loss. Last LDL was at goal 60. he requested I renewed his simvastatin to [facility] today.\par \par BPH with outlet obstruction likely. d/w pt at length. referred to Dr. Hart of urology.\par \par leukocytosis- it appears he normally runs a high-mirna white count. Normal differential noted. We will recheck\par \par \par \par \par

## 2021-04-22 NOTE — PHYSICAL EXAM
[No JVD] : no jugular venous distention [Normal] : normal rate, regular rhythm, normal S1 and S2 and no murmur heard [de-identified] : no S4

## 2021-04-23 ENCOUNTER — NON-APPOINTMENT (OUTPATIENT)
Age: 71
End: 2021-04-23

## 2021-04-23 LAB
ALBUMIN SERPL ELPH-MCNC: 4.5 G/DL
ALP BLD-CCNC: 59 U/L
ALT SERPL-CCNC: 24 U/L
ANION GAP SERPL CALC-SCNC: 10 MMOL/L
AST SERPL-CCNC: 45 U/L
BASOPHILS # BLD AUTO: 0.06 K/UL
BASOPHILS NFR BLD AUTO: 0.7 %
BILIRUB DIRECT SERPL-MCNC: 0.1 MG/DL
BILIRUB INDIRECT SERPL-MCNC: 0.3 MG/DL
BILIRUB SERPL-MCNC: 0.4 MG/DL
BUN SERPL-MCNC: 27 MG/DL
CALCIUM SERPL-MCNC: 9.8 MG/DL
CHLORIDE SERPL-SCNC: 103 MMOL/L
CHOLEST SERPL-MCNC: 127 MG/DL
CO2 SERPL-SCNC: 27 MMOL/L
CREAT SERPL-MCNC: 0.73 MG/DL
EOSINOPHIL # BLD AUTO: 0.56 K/UL
EOSINOPHIL NFR BLD AUTO: 6.9 %
ESTIMATED AVERAGE GLUCOSE: 157 MG/DL
GLUCOSE SERPL-MCNC: 156 MG/DL
HBA1C MFR BLD HPLC: 7.1 %
HCT VFR BLD CALC: 39.7 %
HDLC SERPL-MCNC: 51 MG/DL
HGB BLD-MCNC: 13 G/DL
IMM GRANULOCYTES NFR BLD AUTO: 0.2 %
LDLC SERPL CALC-MCNC: 58 MG/DL
LYMPHOCYTES # BLD AUTO: 2.27 K/UL
LYMPHOCYTES NFR BLD AUTO: 27.9 %
MAN DIFF?: NORMAL
MCHC RBC-ENTMCNC: 31.2 PG
MCHC RBC-ENTMCNC: 32.7 GM/DL
MCV RBC AUTO: 95.2 FL
MONOCYTES # BLD AUTO: 0.6 K/UL
MONOCYTES NFR BLD AUTO: 7.4 %
NEUTROPHILS # BLD AUTO: 4.64 K/UL
NEUTROPHILS NFR BLD AUTO: 56.9 %
NONHDLC SERPL-MCNC: 77 MG/DL
PLATELET # BLD AUTO: 165 K/UL
POTASSIUM SERPL-SCNC: 4.5 MMOL/L
PROT SERPL-MCNC: 6.8 G/DL
RBC # BLD: 4.17 M/UL
RBC # FLD: 12.3 %
SODIUM SERPL-SCNC: 140 MMOL/L
TRIGL SERPL-MCNC: 93 MG/DL
WBC # FLD AUTO: 8.15 K/UL

## 2021-07-18 ENCOUNTER — RX RENEWAL (OUTPATIENT)
Age: 71
End: 2021-07-18

## 2021-07-22 ENCOUNTER — APPOINTMENT (OUTPATIENT)
Dept: INTERNAL MEDICINE | Facility: CLINIC | Age: 71
End: 2021-07-22
Payer: MEDICARE

## 2021-07-22 VITALS
BODY MASS INDEX: 24.48 KG/M2 | WEIGHT: 156 LBS | SYSTOLIC BLOOD PRESSURE: 110 MMHG | DIASTOLIC BLOOD PRESSURE: 60 MMHG | HEART RATE: 68 BPM | HEIGHT: 67 IN

## 2021-07-22 PROCEDURE — 99214 OFFICE O/P EST MOD 30 MIN: CPT | Mod: 25

## 2021-07-22 PROCEDURE — 36415 COLL VENOUS BLD VENIPUNCTURE: CPT

## 2021-07-22 NOTE — HISTORY OF PRESENT ILLNESS
[FreeTextEntry1] : Living at [address] and [facility]\par here for followup of diabetes and hyperlipidemia\par received covid 19 vaccines but does not recall dates\par c/o nocturia, frequency, weaker urinary stream\par got Moderna yesterday\par self-decreased Lantus to 16 units\par \par needs Pvax 23 in JUly [de-identified] : c/o Non Insulin Dependent Diabetes Mellitus. \par      Denies : Patient denies polyuria, polydipsia and polyphagia.   The patient's weight has CHANGED BY -2\par      The patient has been diagnosed with diabetes since 1995. The frequency of the monitoring schedule is frequently using Freestyle. Hypoglycemic episodes are none known. The results of the last Hbg A1c are 7.1 Apr, 7.3 Sanjeev 2021; 7.1 Oct, 7.3 Dec, 7.9 Aug,  8.4 Jan 2019;  9.4 Oct, 9.3 July, 11.3 Mar 2018. Side effects of the medications include NONE. Compliance with the medical regimen has been good . \par      c/o Hypercholesterolemia Last LDL 58 Apr\par Patient denies myalgias. \par      The lipid profile goals for the patient are LDL less than 100 if not 70mg/dl. Dietary modifications have included reduced fat intake . Exercise modifications have included participation in walking . Response to the medications has been fair.\par      Signs of abuse or neglect? No. Fall Risk/History of Falling No.

## 2021-07-22 NOTE — PHYSICAL EXAM
[No JVD] : no jugular venous distention [Normal] : normal rate, regular rhythm, normal S1 and S2 and no murmur heard [de-identified] : no S4

## 2021-07-22 NOTE — ASSESSMENT
[FreeTextEntry1] : Type 2 diabetes mellitus without complications - E11.9 (Primary), Check glu and A1c. Urged to increase efforts at weight loss. Ophtho and foot care discussed AGAIN. Cont meds discussed with patient he will likely need higher doses of his medications. Continue f/u with endo Hellerman. Last A1c was ALMOST AT GOAL 7.1\par \par Mixed hyperlipidemia - E78.2, Check lipid profile and lft's. Cont meds and low chol diet. Urged to increase physical activity and increase efforts at wt loss. Last LDL was at goal 58 he requested I renewed his simvastatin to [facility] today.\par \par BPH with outlet obstruction likely. d/w pt at length. referred to Dr. Hart of urology.\par \par leukocytosis- it appears he normally runs a high-mirna white count. Normal differential noted. Last WBC normal 8.15\par \par \par \par

## 2021-08-09 ENCOUNTER — NON-APPOINTMENT (OUTPATIENT)
Age: 71
End: 2021-08-09

## 2021-08-09 LAB
ALBUMIN SERPL ELPH-MCNC: 4.6 G/DL
ALP BLD-CCNC: 54 U/L
ALT SERPL-CCNC: 24 U/L
ANION GAP SERPL CALC-SCNC: 12 MMOL/L
AST SERPL-CCNC: 50 U/L
BASOPHILS # BLD AUTO: 0.06 K/UL
BASOPHILS NFR BLD AUTO: 0.7 %
BILIRUB DIRECT SERPL-MCNC: 0.2 MG/DL
BILIRUB INDIRECT SERPL-MCNC: 0.3 MG/DL
BILIRUB SERPL-MCNC: 0.4 MG/DL
BUN SERPL-MCNC: 21 MG/DL
CALCIUM SERPL-MCNC: 10.2 MG/DL
CHLORIDE SERPL-SCNC: 100 MMOL/L
CHOLEST SERPL-MCNC: 124 MG/DL
CO2 SERPL-SCNC: 26 MMOL/L
CREAT SERPL-MCNC: 0.84 MG/DL
EOSINOPHIL # BLD AUTO: 0.39 K/UL
EOSINOPHIL NFR BLD AUTO: 4.8 %
ESTIMATED AVERAGE GLUCOSE: 148 MG/DL
GLUCOSE SERPL-MCNC: 159 MG/DL
HBA1C MFR BLD HPLC: 6.8 %
HCT VFR BLD CALC: 41.8 %
HDLC SERPL-MCNC: 59 MG/DL
HGB BLD-MCNC: 13.3 G/DL
IMM GRANULOCYTES NFR BLD AUTO: 0.2 %
LDLC SERPL CALC-MCNC: 55 MG/DL
LYMPHOCYTES # BLD AUTO: 1.92 K/UL
LYMPHOCYTES NFR BLD AUTO: 23.4 %
MAN DIFF?: NORMAL
MCHC RBC-ENTMCNC: 31.1 PG
MCHC RBC-ENTMCNC: 31.8 GM/DL
MCV RBC AUTO: 97.9 FL
MONOCYTES # BLD AUTO: 0.66 K/UL
MONOCYTES NFR BLD AUTO: 8 %
NEUTROPHILS # BLD AUTO: 5.15 K/UL
NEUTROPHILS NFR BLD AUTO: 62.9 %
NONHDLC SERPL-MCNC: 66 MG/DL
PLATELET # BLD AUTO: 176 K/UL
POTASSIUM SERPL-SCNC: 5.3 MMOL/L
PROT SERPL-MCNC: 6.7 G/DL
RBC # BLD: 4.27 M/UL
RBC # FLD: 12.9 %
SODIUM SERPL-SCNC: 139 MMOL/L
TRIGL SERPL-MCNC: 51 MG/DL
WBC # FLD AUTO: 8.2 K/UL

## 2021-09-03 ENCOUNTER — APPOINTMENT (OUTPATIENT)
Dept: ENDOCRINOLOGY | Facility: CLINIC | Age: 71
End: 2021-09-03
Payer: MEDICARE

## 2021-09-03 PROCEDURE — 99214 OFFICE O/P EST MOD 30 MIN: CPT | Mod: 95

## 2021-09-03 RX ORDER — INSULIN LISPRO 100 [IU]/ML
100 INJECTION, SOLUTION INTRAVENOUS; SUBCUTANEOUS
Qty: 2 | Refills: 1 | Status: ACTIVE | COMMUNITY
Start: 2020-11-03 | End: 1900-01-01

## 2021-09-03 RX ORDER — LIRAGLUTIDE 6 MG/ML
18 INJECTION SUBCUTANEOUS DAILY
Qty: 3 | Refills: 3 | Status: ACTIVE | COMMUNITY
Start: 1900-01-01 | End: 1900-01-01

## 2021-09-04 NOTE — HISTORY OF PRESENT ILLNESS
[Home] : at home, [unfilled] , at the time of the visit. [Medical Office: ([facility])___] : at the medical office located in  [Verbal consent obtained from patient] : the patient, [unfilled] [FreeTextEntry1] : Sep 03, 2021        Amwell TEB \par \par  PCP:  Dr. Abdi Colon\par           Eyes:  Dr. Kenisha malave recently \par           GI: [facility] - up to date\par \par \par CC:  Diabetes (~age 47)\par \par Retired . \par \par Most recent A1c levels. \par Jan 23, 2021 A1c 7.3 % \par Aug 9, 2021  A1c 6.8 %\par \par On Victoza 1.8 daily\par metformin 1000 BID\par Lantus 10- 12 units HS \par \par Humalog BID - TID AC by sliding scale:  none if sugar under 140;  3 units if BS over 140.  \par Checking sugars at least 4X a day. \par \par Impression:  \par A1c improving.\par He continues to find the Freestyle Colin 14 helpful.  \par \par Plan:  Same Rx.\par I have asked him to send me images from the Colin 14 for review. \par ROV by January 2022\par \par \par 7/22/2021  labs included 6.8 %\par \par Feb 11, 2021    in person\par \par  PCP:  Dr. Abdi Colon\par           Eyes:  Dr. Kenisha malave recently \par           GI: [facility] - up to date\par \par \par CC:  Diabetes (~age 47)\par \par Jan 23, 2021 A1c 7.3 %  (lower when he was in [place])    Now less active in [place] apartment.  \par On Victoza 1.8 daily\par Lantus 12 units HS \par metformin 1000 BID\par Humalog BID - TID AC by sliding scale\par \par Continues to find the Freestyle Colin 14 very helpful\par \par Data reviewed.   If he has chili over rice, BS goes up to 250 mg/dl\par \par Impression:  Working hard to control sugars.\par He has good understanding of relatin between his diet and resultsing sugars.\par \par Plan:  Reviewed ADA gudielines.\par Same Rx\par \par \par \par \par \par \par Nov 03, 2020       Android    Doximity\par \par  PCP:  Dr. Abdi Colon\par           Eyes:  Dr. De - needs to see\par           GI: [facility] - saw about a year ago\par \par \par CC:  Diabetes (~age 47)\par \par Was out of town  and A1c went down to 7.1 % from 7.3 in Dec 2019, 7.9 in Augu 2019 and 8.4 in April 2019\par He attributes much of the improvement to multiple tests he can do with Freestyle Colin 14, more than 4X a day.\par \par Has been able to lower Lantus to 12 units a day HS.\par Humalog by sliding scale. 2- 3 times a day, none if glucose   \par \par Impression:  BS at visit to Dr. Colon was over 200 mg/dl after a sandwich; however, A1c trajectory is improving.\par \par Plan:   Same Rx and ROV in February 2021.        \par \par \par \par \par \par Sanjeev 15, 2020       Allscripts broken\par \par PCP:  Dr. Abdi Colon\par           Eyes:  Dr. De - needs to see\par           GI: [facility] - saw about a year ago\par \par \par CC:  Diabetes (~age 47)\par \par December 3, 2019  A1c 7.3 % (had been 8.4 in April 2019)\par Monitoring blood sugars at least 4X a day.\par Using Colin 14 CGM which he reports has been very helpful.\par Data reviewed still shows some spikes after meals\par He has been able to lower dose of Lantus from 32 units in PM, now down to about\par 16 units.\par Also remains on Victoza.\par ^Humalog at least twice a day before meals also assists in blunting rise in sugar.^\par ^Injects insulin 3X a day.^\par \par Impression:  Has good understanding of needed strategies to continue to control diabetes.\par Plans to see ophthalmology.\par ROV in May or June\par \par \par \par August 13, 2019\par \par PCP:  Dr. Abdi Colon\par           Eyes:  Dr. De\par \par \par CC:  Diabetes\par \par ^  Monitoring fingerstick BS at home at least 4 times a day and taking 3 shots of insulin.\par He notes occasional low blood sugar, particularly before breakfast.\par It is likely that his closer attention to diet and activity has decreased his insulin\par requirements.  So he will start to slowly taper the Lantus dose down from 32 units.\par \par Impression:  Based on his information, he still has wide fluctuations in blood glucose, although less so\par than previously.   He would be excellent candidate for CGM with Colin 14 and that is in the works.\par He will have A1c today.    I asked him to return in January and to see Dr. Colon in the interval.  \par \par \par \par February 26, 2019\par \par  PCP:   Dr. Abdi Colon\par             Eyes:   saw  [facility] opthal several months\par             GI:  recent colonoscopy - [facility]\par             \par            .\par            CC: Diabetes (~46 yo)\par \par Last A1c 8.4 %\par He would be an excellent candidate for CGM:  Freestyle Colin as he has widely fluctuating blood sugars,\par checks his fingerstick 4 times a day and injects at least 3 shots of insulin day. \par \par Previous notes from [employer] appended below.\par He was a  for [employer] and retired about a year ago.  \par As noted by Dr. Colon, he is now residing in [place].  \par ^He is testing his fingerstick blood sugars at least 4 times a day with his Accu-meter \par Taking Lantus 32 units  at bedtime and   +++\par Humalog 3 units before breakfast and 3 units before dinner if blood glucose is over 150 mg/dl\par           Victoza 1.8                ++\par            Metformin 1000 BID   ++\par He notes occasional hypoglycemia - especially before breakfast.\par He continues to note wide fluctuations in his sugars.^   \par Recent January 8, 2019 HbA1c was 8.4 % \par \par Impression:  He would be a good candidate for CGM with a Freestyle Colin meter.\par \par Plan:  Refills on Humalog insulin today.  \par Continue same dose of Lantus.  \par \par - \par \par  March 26, 2018\par            .\par            PCP: Moved from [facility] - Dr. 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 May add short acting insulin before meals. \par            To Dermatology b/o finger lesion.\par            Will need local PCP. \par

## 2021-10-21 ENCOUNTER — APPOINTMENT (OUTPATIENT)
Dept: INTERNAL MEDICINE | Facility: CLINIC | Age: 71
End: 2021-10-21
Payer: MEDICARE

## 2021-10-21 VITALS
HEIGHT: 67 IN | BODY MASS INDEX: 23.86 KG/M2 | HEART RATE: 66 BPM | SYSTOLIC BLOOD PRESSURE: 100 MMHG | WEIGHT: 152 LBS | DIASTOLIC BLOOD PRESSURE: 50 MMHG

## 2021-10-21 PROCEDURE — 90662 IIV NO PRSV INCREASED AG IM: CPT

## 2021-10-21 PROCEDURE — 36415 COLL VENOUS BLD VENIPUNCTURE: CPT

## 2021-10-21 PROCEDURE — 99214 OFFICE O/P EST MOD 30 MIN: CPT | Mod: 25

## 2021-10-21 PROCEDURE — G0008: CPT

## 2021-10-21 NOTE — ASSESSMENT
[FreeTextEntry1] : Type 2 diabetes mellitus without complications - E11.9 (Primary), Check glu and A1c. Urged to increase efforts at weight loss. Ophtho and foot care discussed AGAIN. Cont meds discussed with patient he will likely need higher doses of his medications. Continue f/u with endo Hellerman. Last A1c was AT GOAL 6.8\par \par Mixed hyperlipidemia - E78.2, Check lipid profile and lft's. Cont meds and low chol diet. Urged to increase physical activity and increase efforts at wt loss. Last LDL was at goal 55\par \par BPH with outlet obstruction likely. Previously d/w pt at length. referred to Dr. Hart of urology.\par \par leukocytosis- it appears he normally runs a high-mirna white count. Normal differential noted. Last WBC normal 8.20\par \par Risks/benefits of flu vaccine discussed with patient and patient understands and accepts.\par \par \par

## 2021-10-21 NOTE — PHYSICAL EXAM
[No JVD] : no jugular venous distention [Normal] : normal rate, regular rhythm, normal S1 and S2 and no murmur heard [de-identified] : no S4

## 2021-10-21 NOTE — HISTORY OF PRESENT ILLNESS
[FreeTextEntry1] : Living at [address] and [facility]\par here for followup of diabetes and hyperlipidemia\par received covid 19 vaccines but does not recall dates\par c/o nocturia, frequency, weaker urinary stream\par got Moderna yesterday\par self-decreased Lantus to 16 units\par \par needs Pvax 23 in JUly [de-identified] : c/o Non Insulin Dependent Diabetes Mellitus. \par      Denies : Patient denies polyuria, polydipsia and polyphagia.   The patient's weight has CHANGED BY -2\par      The patient has been diagnosed with diabetes since 1995. The frequency of the monitoring schedule is frequently using Freestyle. Hypoglycemic episodes are none known. The results of the last Hbg A1c are 6.8 July, 7.1 Apr, 7.3 Jan 2021; 7.1 Oct, 7.3 Dec, 7.9 Aug,  8.4 Jan 2019;  9.4 Oct, 9.3 July, 11.3 Mar 2018. Side effects of the medications include NONE. Compliance with the medical regimen has been GOOD. \par      c/o Hypercholesterolemia Last LDL 55 July\par Patient denies myalgias. \par      The lipid profile goals for the patient are LDL less than 100 if not 70mg/dl. Dietary modifications have included reduced fat intake . Exercise modifications have included participation in walking . Response to the medications has been fair.\par      Signs of abuse or neglect? No. Fall Risk/History of Falling No.

## 2021-10-25 LAB
ALBUMIN SERPL ELPH-MCNC: 4.6 G/DL
ALP BLD-CCNC: 64 U/L
ALT SERPL-CCNC: 28 U/L
ANION GAP SERPL CALC-SCNC: 13 MMOL/L
AST SERPL-CCNC: 56 U/L
BASOPHILS # BLD AUTO: 0.08 K/UL
BASOPHILS NFR BLD AUTO: 1.1 %
BILIRUB DIRECT SERPL-MCNC: 0.1 MG/DL
BILIRUB INDIRECT SERPL-MCNC: 0.2 MG/DL
BILIRUB SERPL-MCNC: 0.3 MG/DL
BUN SERPL-MCNC: 30 MG/DL
CALCIUM SERPL-MCNC: 9.7 MG/DL
CHLORIDE SERPL-SCNC: 104 MMOL/L
CHOLEST SERPL-MCNC: 144 MG/DL
CO2 SERPL-SCNC: 24 MMOL/L
CREAT SERPL-MCNC: 0.89 MG/DL
EOSINOPHIL # BLD AUTO: 0.4 K/UL
EOSINOPHIL NFR BLD AUTO: 5.6 %
ESTIMATED AVERAGE GLUCOSE: 148 MG/DL
GLUCOSE SERPL-MCNC: 97 MG/DL
HBA1C MFR BLD HPLC: 6.8 %
HCT VFR BLD CALC: 38.7 %
HDLC SERPL-MCNC: 70 MG/DL
HGB BLD-MCNC: 12.7 G/DL
IMM GRANULOCYTES NFR BLD AUTO: 0.1 %
LDLC SERPL CALC-MCNC: 65 MG/DL
LYMPHOCYTES # BLD AUTO: 2.06 K/UL
LYMPHOCYTES NFR BLD AUTO: 29 %
MAN DIFF?: NORMAL
MCHC RBC-ENTMCNC: 31.4 PG
MCHC RBC-ENTMCNC: 32.8 GM/DL
MCV RBC AUTO: 95.8 FL
MONOCYTES # BLD AUTO: 0.44 K/UL
MONOCYTES NFR BLD AUTO: 6.2 %
NEUTROPHILS # BLD AUTO: 4.11 K/UL
NEUTROPHILS NFR BLD AUTO: 58 %
NONHDLC SERPL-MCNC: 74 MG/DL
PLATELET # BLD AUTO: 188 K/UL
POTASSIUM SERPL-SCNC: 5 MMOL/L
PROT SERPL-MCNC: 6.5 G/DL
RBC # BLD: 4.04 M/UL
RBC # FLD: 12.6 %
SODIUM SERPL-SCNC: 141 MMOL/L
TRIGL SERPL-MCNC: 46 MG/DL
WBC # FLD AUTO: 7.1 K/UL

## 2021-11-18 ENCOUNTER — APPOINTMENT (OUTPATIENT)
Dept: INTERNAL MEDICINE | Facility: CLINIC | Age: 71
End: 2021-11-18

## 2021-12-01 ENCOUNTER — APPOINTMENT (OUTPATIENT)
Dept: ENDOCRINOLOGY | Facility: CLINIC | Age: 71
End: 2021-12-01
Payer: MEDICARE

## 2021-12-01 PROCEDURE — 99214 OFFICE O/P EST MOD 30 MIN: CPT | Mod: 95

## 2021-12-09 ENCOUNTER — APPOINTMENT (OUTPATIENT)
Dept: INTERNAL MEDICINE | Facility: CLINIC | Age: 71
End: 2021-12-09
Payer: MEDICARE

## 2021-12-09 VITALS
WEIGHT: 150 LBS | BODY MASS INDEX: 23.54 KG/M2 | DIASTOLIC BLOOD PRESSURE: 60 MMHG | SYSTOLIC BLOOD PRESSURE: 100 MMHG | HEIGHT: 67 IN | HEART RATE: 68 BPM

## 2021-12-09 PROCEDURE — 36415 COLL VENOUS BLD VENIPUNCTURE: CPT

## 2021-12-09 PROCEDURE — 99213 OFFICE O/P EST LOW 20 MIN: CPT | Mod: 25

## 2021-12-09 NOTE — ASSESSMENT
[FreeTextEntry1] : New anemia- will recheck to verify and check iron, B12, folate studies. To heme if confirmed as doubt gi blood loss with normal colonoscopy to years ago and normal mcv.\par \par Type 2 diabetes mellitus without complications - E11.9 (Primary), Check glu and A1c. Urged to increase efforts at weight loss. Ophtho and foot care discussed AGAIN. Cont meds discussed with patient he will likely need higher doses of his medications. Continue f/u with endo Hellerman. Last A1c was AT GOAL 6.8\par \par Mixed hyperlipidemia - E78.2, Check lipid profile and lft's. Cont meds and low chol diet. Urged to increase physical activity and increase efforts at wt loss. Last LDL was at goal 65\par \par BPH with outlet obstruction likely. Previously d/w pt at length. referred to Dr. Hart of urology.\par \par leukocytosis- it appears he normally runs a high-mirna white count. Normal differential noted. Last WBC normal 7.10\par \par \par \par \par

## 2021-12-09 NOTE — PHYSICAL EXAM
[Normal] : no acute distress, well nourished, well developed and well-appearing [de-identified] : no conjunctival pallor [de-identified] : no pallor

## 2021-12-09 NOTE — HISTORY OF PRESENT ILLNESS
[FreeTextEntry1] : Living at [address] and [facility]\par Missed last appt as he called at time of visit to say he would be 20 min late\par here for followup of NEW ANEMIA\par received covid 19 vaccines but does not recall dates\par c/o nocturia, frequency, weaker urinary stream\par got Moderna yesterday\par self-decreased Lantus to 16 units\par \par needs Pvax 23 in JUly [de-identified] : c/o Non Insulin Dependent Diabetes Mellitus. \par      Denies : Patient denies polyuria, polydipsia and polyphagia.   The patient's weight has CHANGED BY -2\par      The patient has been diagnosed with diabetes since 1995. The frequency of the monitoring schedule is frequently using Freestyle. Hypoglycemic episodes are none known. The results of the last Hbg A1c are 6.8 Oct, 6.8 July, 7.1 Apr, 7.3 Sanjeev 2021; 7.1 Oct, 7.3 Dec, 7.9 Aug,  8.4 Jan 2019;  9.4 Oct, 9.3 July, 11.3 Mar 2018. Side effects of the medications include NONE. Compliance with the medical regimen has been GOOD. \par      c/o Hypercholesterolemia Last LDL 65 Oct\par Patient denies myalgias. \par      The lipid profile goals for the patient are LDL less than 100 if not 70mg/dl. Dietary modifications have included reduced fat intake . Exercise modifications have included participation in walking . Response to the medications has been fair.\par      Signs of abuse or neglect? No. Fall Risk/History of Falling No.

## 2021-12-10 NOTE — HISTORY OF PRESENT ILLNESS
[Other Location: e.g. School (Enter Location, City,State)___] : at [unfilled], at the time of the visit. [Medical Office: ([facility])___] : at the medical office located in  [Verbal consent obtained from patient] : the patient, [unfilled] [FreeTextEntry1] : Dec 01, 2021     \par \par PCP:  Dr. Abdi Colon\par           Eyes:  Dr. Kenisha malave recently \par           GI: [facility] - up to date\par \par \par CC:  Diabetes (~age 47)\par \par Retired . \par \par Most recent A1c levels. \par Jan 23, 2021 A1c 7.3 % \par Aug 9, 2021  A1c 6.8 %\par Oct 21, 2021  A1c 6.8 %\par \par Remains on:\par \par On Victoza 1.8 daily\par metformin 1000 BID\par Lantus 10- 12 units HS \par \par Humalog BID - TID AC by sliding scale:  none if sugar under 140;  3 units if BS over 140.  \par Checking sugars at least 4X a day. \par \par Needs more Colin 2 sensors.   Can put deep on his android phone.\par \par No symptomatic hypoglycemia although down to 70-80    \par \par Impression:  BS and A1c in good range on current Rx with attention to diet, activity.\par \par Plan:  Same Rx\par \par \par \par Sep 03, 2021        Amwell TEB \par \par  PCP:  Dr. Abdi Colon\par           Eyes:  Dr. Kenisha malave recently \par           GI: [facility] - up to date\par \par \par CC:  Diabetes (~age 47)\par \par Retired . \par \par Most recent A1c levels. \par Jan 23, 2021 A1c 7.3 % \par Aug 9, 2021  A1c 6.8 %\par \par On Victoza 1.8 daily\par metformin 1000 BID\par Lantus 10- 12 units HS \par \par Humalog BID - TID AC by sliding scale:  none if sugar under 140;  3 units if BS over 140.  \par Checking sugars at least 4X a day. \par \par Impression:  \par A1c improving.\par He continues to find the Freestyle Colin 14 helpful.  \par \par Plan:  Same Rx.\par I have asked him to send me images from the Colin 14 for review. \par ROV by January 2022\par \par \par 7/22/2021  labs included 6.8 %\par \par Feb 11, 2021    in person\par \par  PCP:  Dr. Abdi Colon\par           Eyes:  Dr. De - saw recently \par           GI: [facility] - up to date\par \par \par CC:  Diabetes (~age 47)\par \par Jan 23, 2021 A1c 7.3 %  (lower when he was in [place])    Now less active in [place] apartment.  \par On Victoza 1.8 daily\par Lantus 12 units HS \par metformin 1000 BID\par Humalog BID - TID AC by sliding scale\par \par Continues to find the Freestyle Colin 14 very helpful\par \par Data reviewed.   If he has chili over rice, BS goes up to 250 mg/dl\par \par Impression:  Working hard to control sugars.\par He has good understanding of relatin between his diet and resultsing sugars.\par \par Plan:  Reviewed ADA gudielines.\par Same Rx\par \par \par \par \par \par \par Nov 03, 2020       Android    D[employer]imity\par \par  PCP:  Dr. Abdi Colon\par           Eyes:  Dr. De - needs to see\par           GI: [facility] - saw about a year ago\par \par \par CC:  Diabetes (~age 47)\par \par Was out of town  and A1c went down to 7.1 % from 7.3 in Dec 2019, 7.9 in Augu 2019 and 8.4 in April 2019\par He attributes much of the improvement to multiple tests he can do with Freestyle Colin 14, more than 4X a day.\par \par Has been able to lower Lantus to 12 units a day HS.\par Humalog by sliding scale. 2- 3 times a day, none if glucose   \par \par Impression:  BS at visit to Dr. Colon was over 200 mg/dl after a sandwich; however, A1c trajectory is improving.\par \par Plan:   Same Rx and ROV in February 2021.        \par \par \par \par \par \par Sanjeev 15, 2020       Allscripts broken\par \par PCP:  Dr. Abdi Colon\par           Eyes:  Dr. Kenisha Krueger needs to see\par           GI: [facility] - saw about a year ago\par \par \par CC:  Diabetes (~age 47)\par \par December 3, 2019  A1c 7.3 % (had been 8.4 in April 2019)\par Monitoring blood sugars at least 4X a day.\par Using Colin 14 CGM which he reports has been very helpful.\par Data reviewed still shows some spikes after meals\par He has been able to lower dose of Lantus from 32 units in PM, now down to about\par 16 units.\par Also remains on Victoza.\par ^Humalog at least twice a day before meals also assists in blunting rise in sugar.^\par ^Injects insulin 3X a day.^\par \par Impression:  Has good understanding of needed strategies to continue to control diabetes.\par Plans to see ophthalmology.\par ROV in May or June\par \par \par \par August 13, 2019\par \par PCP:  Dr. Abdi Colon\par           Eyes:  Dr. De\par \par \par CC:  Diabetes\par \par ^  Monitoring fingerstick BS at home at least 4 times a day and taking 3 shots of insulin.\par He notes occasional low blood sugar, particularly before breakfast.\par It is likely that his closer attention to diet and activity has decreased his insulin\par requirements.  So he will start to slowly taper the Lantus dose down from 32 units.\par \par Impression:  Based on his information, he still has wide fluctuations in blood glucose, although less so\par than previously.   He would be excellent candidate for CGM with Colin 14 and that is in the works.\par He will have A1c today.    I asked him to return in January and to see Dr. Colon in the interval.  \par \par \par \par February 26, 2019\par \par  PCP:   Dr. Abdi Colon\par             Eyes:   saw  [facility] opthal several months\par             GI:  recent colonoscopy - [facility]\par             \par            .\par            CC: Diabetes (~48 yo)\par \par Last A1c 8.4 %\par He would be an excellent candidate for CGM:  Freestyle Colin as he has widely fluctuating blood sugars,\par checks his fingerstick 4 times a day and injects at least 3 shots of insulin day. \par \par Previous notes from [employer] appended below.\par He was a  for [employer] and retired about a year ago.  \par As noted by Dr. Colon, he is now residing in [place].  \par ^He is testing his fingerstick blood sugars at least 4 times a day with his Accu-meter \par Taking Lantus 32 units  at bedtime and   +++\par Humalog 3 units before breakfast and 3 units before dinner if blood glucose is over 150 mg/dl\par           Victoza 1.8                ++\par            Metformin 1000 BID   ++\par He notes occasional hypoglycemia - especially before breakfast.\par He continues to note wide fluctuations in his sugars.^   \par Recent January 8, 2019 HbA1c was 8.4 % \par \par Impression:  He would be a good candidate for CGM with a Freestyle Colin meter.\par \par Plan:  Refills on Humalog insulin today.  \par Continue same dose of Lantus.  \par \par - \par \par  March 26, 2018\par            .\par            PCP: Moved from [facility] - Dr. 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 May add short acting insulin before meals. \par            To Dermatology b/o finger lesion.\par            Will need local PCP. \par

## 2021-12-13 LAB
BASOPHILS # BLD AUTO: 0.05 K/UL
BASOPHILS NFR BLD AUTO: 0.6 %
EOSINOPHIL # BLD AUTO: 0.32 K/UL
EOSINOPHIL NFR BLD AUTO: 4.1 %
FOLATE SERPL-MCNC: 18.3 NG/ML
HCT VFR BLD CALC: 36.1 %
HGB BLD-MCNC: 11.9 G/DL
IMM GRANULOCYTES NFR BLD AUTO: 0.1 %
IRON SATN MFR SERPL: 27 %
IRON SERPL-MCNC: 82 UG/DL
LYMPHOCYTES # BLD AUTO: 1.99 K/UL
LYMPHOCYTES NFR BLD AUTO: 25.4 %
MAN DIFF?: NORMAL
MCHC RBC-ENTMCNC: 31.2 PG
MCHC RBC-ENTMCNC: 33 GM/DL
MCV RBC AUTO: 94.5 FL
MONOCYTES # BLD AUTO: 0.48 K/UL
MONOCYTES NFR BLD AUTO: 6.1 %
NEUTROPHILS # BLD AUTO: 4.97 K/UL
NEUTROPHILS NFR BLD AUTO: 63.7 %
PLATELET # BLD AUTO: 188 K/UL
RBC # BLD: 3.82 M/UL
RBC # FLD: 12.2 %
TIBC SERPL-MCNC: 306 UG/DL
UIBC SERPL-MCNC: 224 UG/DL
VIT B12 SERPL-MCNC: 793 PG/ML
WBC # FLD AUTO: 7.82 K/UL

## 2021-12-15 LAB — METHYLMALONATE SERPL-SCNC: 254 NMOL/L

## 2021-12-23 ENCOUNTER — NON-APPOINTMENT (OUTPATIENT)
Age: 71
End: 2021-12-23

## 2022-01-20 ENCOUNTER — APPOINTMENT (OUTPATIENT)
Dept: HEMATOLOGY ONCOLOGY | Facility: CLINIC | Age: 72
End: 2022-01-20
Payer: MEDICARE

## 2022-01-20 ENCOUNTER — RESULT REVIEW (OUTPATIENT)
Age: 72
End: 2022-01-20

## 2022-01-20 VITALS
HEART RATE: 76 BPM | TEMPERATURE: 97.4 F | SYSTOLIC BLOOD PRESSURE: 148 MMHG | OXYGEN SATURATION: 95 % | DIASTOLIC BLOOD PRESSURE: 68 MMHG | HEIGHT: 67.13 IN | RESPIRATION RATE: 16 BRPM | WEIGHT: 156.5 LBS | BODY MASS INDEX: 24.28 KG/M2

## 2022-01-20 VITALS
BODY MASS INDEX: 19.68 KG/M2 | RESPIRATION RATE: 16 BRPM | DIASTOLIC BLOOD PRESSURE: 78 MMHG | WEIGHT: 126.9 LBS | SYSTOLIC BLOOD PRESSURE: 143 MMHG | HEIGHT: 67.13 IN | TEMPERATURE: 96.7 F | OXYGEN SATURATION: 99 % | HEART RATE: 65 BPM

## 2022-01-20 PROCEDURE — 36415 COLL VENOUS BLD VENIPUNCTURE: CPT

## 2022-01-20 PROCEDURE — 99205 OFFICE O/P NEW HI 60 MIN: CPT | Mod: 25

## 2022-01-20 NOTE — HISTORY OF PRESENT ILLNESS
[de-identified] : Mr. August Moreno is 71 year old male with normocytic anemia here for consultation, referred by Dr. Duglas Correia\par \par Patient with past medical history of  type 2 diabetes who had blood work done with Dr. Colon on 12/13/21 Hgb 11.9 hct 36.1 MCV 94.5 - normal since Oct 2021\par  iron sat 27%, normal b12, folate, and MMA\par \par Colonoscopy 2019 with polyps - repeat every 5 years. \par \par FHx: Denies of family history hematological and/or oncological \par SHx: lives in [place], retired - was in technical sales. never smoked and denies alcohol intake. \par \par Received [employer] and Pfizer booster in Nov 2021\par \par He's active, volunteering with gardening and staying very active. \par

## 2022-01-20 NOTE — CONSULT LETTER
[Dear  ___] : Dear  [unfilled], [Consult Letter:] : I had the pleasure of evaluating your patient, [unfilled]. [Please see my note below.] : Please see my note below. [Consult Closing:] : Thank you very much for allowing me to participate in the care of this patient.  If you have any questions, please do not hesitate to contact me. [Sincerely,] : Sincerely, [FreeTextEntry3] : Cristian Watts MD, MPH\par  of Medicine [facility] at [facility]\par Attending Physician \par Hematology and Medical Oncology\par [facility]\par

## 2022-01-20 NOTE — ASSESSMENT
[FreeTextEntry1] : Normocytic anemia\par Symptoms - mild tired and fatigued. \par No BRBRP, melena, hematuria\par Colonoscopy (2019) with Dr Quan Sparrow- had polyps which were benign. \par Just received an email to have a repeat\par Discussed at length about the differential diagnosis including nutritional (iron def, B12 def, Folate Def); hemolysis; anemia of kidney disease; anemia of inflammation; multiple myeloma; drug induced; bone marrow conditions such as MDS, aplastic anemia, myelofibrosis; etc\par Send blood work today\par UA and hemoccult\par He will discuss with his GI if they can repeat EGD along with colonoscopy\par \par Transaminitis\par Elevated AST\par Denies alcohol intake\par Obtain USG\par Possible related to fatty liver (He has DM)\par \par Family history:\par No family ho blood disorders and cancer\par \par Social\par Lives with wife and daughter in [place]\par Has a cat\par Work - Retired.  with [employer]\par \par Patient had multiple questions which were answered to satisfaction\par \par Follow up in 3 weeks with blood, urine, stool and USG\par NO labs

## 2022-01-27 ENCOUNTER — RESULT REVIEW (OUTPATIENT)
Age: 72
End: 2022-01-27

## 2022-01-27 ENCOUNTER — APPOINTMENT (OUTPATIENT)
Dept: INTERNAL MEDICINE | Facility: CLINIC | Age: 72
End: 2022-01-27
Payer: MEDICARE

## 2022-01-27 VITALS
HEIGHT: 67 IN | HEART RATE: 68 BPM | BODY MASS INDEX: 24.48 KG/M2 | WEIGHT: 156 LBS | SYSTOLIC BLOOD PRESSURE: 110 MMHG | DIASTOLIC BLOOD PRESSURE: 60 MMHG

## 2022-01-27 PROCEDURE — 99214 OFFICE O/P EST MOD 30 MIN: CPT | Mod: 25

## 2022-01-27 PROCEDURE — 36415 COLL VENOUS BLD VENIPUNCTURE: CPT

## 2022-01-27 NOTE — PHYSICAL EXAM
[Normal] : no acute distress, well nourished, well developed and well-appearing [de-identified] : no conjunctival pallor [de-identified] : no pallor

## 2022-01-27 NOTE — ASSESSMENT
[FreeTextEntry1] : New anemia- will recheck to verify and check iron, B12, folate studies. To heme if confirmed as doubt gi blood loss with normal colonoscopy two years ago and normal mcv. Last hct 37.5. Will not repeat any anemia labs as they were done one week ago.\par \par Type 2 diabetes mellitus without complications - E11.9 (Primary), Check glu and A1c. Urged to increase efforts at weight loss. Ophtho and foot care discussed AGAIN. Cont meds discussed with patient he will likely need higher doses of his medications. Continue f/u with endo Hellerman. Last A1c was AT GOAL 6.8\par \par Mixed hyperlipidemia - E78.2, Check lipid profile and lft's. Cont meds and low chol diet. Urged to increase physical activity and increase efforts at wt loss. Last LDL was at goal 65\par \par BPH with outlet obstruction likely. Previously d/w pt at length. referred to Dr. Hart of urology.\par \par leukocytosis- it appears he normally runs a high-mirna white count. Normal differential noted. Last WBC normal 7.93\par \par \par \par \par

## 2022-01-27 NOTE — HISTORY OF PRESENT ILLNESS
[FreeTextEntry1] : here for followup of diabetes, hyperlipidemia\par having active w/u of anemia\par received covid 19 vaccines but does not recall dates\par c/o nocturia, frequency, weaker urinary stream\par self-decreased Lantus to 16 units\par \par needs Pvax 23 in JUly [de-identified] : c/o Non Insulin Dependent Diabetes Mellitus. \par      Denies : Patient denies polyuria, polydipsia and polyphagia.   The patient's weight has CHANGED BY -2\par      The patient has been diagnosed with diabetes since 1995. The frequency of the monitoring schedule is frequently using Freestyle. Hypoglycemic episodes are none known. The results of the last Hbg A1c are 6.8 Oct, 6.8 July, 7.1 Apr, 7.3 Sanjeev 2021; 7.1 Oct, 7.3 Dec, 7.9 Aug,  8.4 Jan 2019;  9.4 Oct, 9.3 July, 11.3 Mar 2018. Side effects of the medications include NONE. Compliance with the medical regimen has been GOOD. \par      c/o Hypercholesterolemia Last LDL 65 Oct\par Patient denies myalgias. \par      The lipid profile goals for the patient are LDL less than 100 if not 70mg/dl. Dietary modifications have included reduced fat intake . Exercise modifications have included participation in walking . Response to the medications has been fair.\par      Signs of abuse or neglect? No. Fall Risk/History of Falling No.

## 2022-01-28 ENCOUNTER — RESULT REVIEW (OUTPATIENT)
Age: 72
End: 2022-01-28

## 2022-01-28 LAB
ALBUMIN SERPL ELPH-MCNC: 4.6 G/DL
ALP BLD-CCNC: 57 U/L
ALT SERPL-CCNC: 22 U/L
ANION GAP SERPL CALC-SCNC: 10 MMOL/L
AST SERPL-CCNC: 52 U/L
BILIRUB SERPL-MCNC: 0.5 MG/DL
BUN SERPL-MCNC: 28 MG/DL
CALCIUM SERPL-MCNC: 10 MG/DL
CHLORIDE SERPL-SCNC: 105 MMOL/L
CHOLEST SERPL-MCNC: 121 MG/DL
CO2 SERPL-SCNC: 25 MMOL/L
CREAT SERPL-MCNC: 0.76 MG/DL
ESTIMATED AVERAGE GLUCOSE: 146 MG/DL
GLUCOSE SERPL-MCNC: 115 MG/DL
HBA1C MFR BLD HPLC: 6.7 %
HDLC SERPL-MCNC: 64 MG/DL
LDLC SERPL CALC-MCNC: 50 MG/DL
NONHDLC SERPL-MCNC: 57 MG/DL
POTASSIUM SERPL-SCNC: 5.7 MMOL/L
PROT SERPL-MCNC: 6.4 G/DL
SODIUM SERPL-SCNC: 141 MMOL/L
TRIGL SERPL-MCNC: 36 MG/DL

## 2022-02-10 ENCOUNTER — APPOINTMENT (OUTPATIENT)
Dept: HEMATOLOGY ONCOLOGY | Facility: CLINIC | Age: 72
End: 2022-02-10
Payer: MEDICARE

## 2022-02-10 VITALS
SYSTOLIC BLOOD PRESSURE: 104 MMHG | DIASTOLIC BLOOD PRESSURE: 54 MMHG | RESPIRATION RATE: 17 BRPM | HEIGHT: 67 IN | OXYGEN SATURATION: 98 % | TEMPERATURE: 96.9 F | HEART RATE: 81 BPM | WEIGHT: 157.56 LBS | BODY MASS INDEX: 24.73 KG/M2

## 2022-02-10 PROCEDURE — 99214 OFFICE O/P EST MOD 30 MIN: CPT

## 2022-02-10 NOTE — CONSULT LETTER
[Dear  ___] : Dear  [unfilled], [Consult Letter:] : I had the pleasure of evaluating your patient, [unfilled]. [Please see my note below.] : Please see my note below. [Consult Closing:] : Thank you very much for allowing me to participate in the care of this patient.  If you have any questions, please do not hesitate to contact me. [Sincerely,] : Sincerely, [FreeTextEntry3] : Cristian Watts MD, MPH\par  of Medicine [facility] at [facility]\par Attending Physician \par Hematology and Medical Oncology\par [facility]\par

## 2022-02-10 NOTE — ASSESSMENT
[FreeTextEntry1] : Normocytic anemia (mild)\par Symptoms - mild tired and fatigued. \par No BRBRP, melena, hematuria\par Colonoscopy (2019) with Dr Quan Sparrow- had polyps which were benign. he is trying to schedule a repeat. \par Blood work show mild iron deficiency. Otherwise normal\par We have  not ruled out bone marrow disorders, which would require bone marrow\par However the suspicion is low nad his Hgb is more than 10\par At this time, will try to replete iron and assess\par Advised to try OTC iron (ferrous sulfate, ferrous gluconate, liquid iron) once a day or once every other day. \par Discussed about the side effects including constipation, gastritis, darkening of stools/ tongue etc\par Advised to take it on empty stomach with plain water or orange juice. Avoid taking along with antacids, milk or mild products\par If inadequate response or unable to tolerate, will consider IV iron\par \par Transaminitis\par Elevated AST\par Denies alcohol intake\par USG shows fatty liver\par He will discuss with Dr Colon\par \par Family history:\par No family ho blood disorders and cancer\par \par Social\par Lives with wife and daughter in [place]\par Has a cat\par Work - Retired.  with [employer]\par \par Patient had multiple questions which were answered to satisfaction\par \par Follow up in 3 months\par CBC, CMP, iron studies, ferritin\par

## 2022-02-10 NOTE — RESULTS/DATA
[FreeTextEntry1] : Labs reviewed, analyzed and discussed\par \par Hemoccult x 3 - negative\par UA- no hematuria\par \par USG abdomen- Fatty liver

## 2022-02-10 NOTE — HISTORY OF PRESENT ILLNESS
[de-identified] : Mr. August Moreno is 71 year old male with normocytic anemia here for consultation, referred by Dr. Duglas Croreia\par \par Patient with past medical history of  type 2 diabetes who had blood work done with Dr. Colon on 12/13/21 Hgb 11.9 hct 36.1 MCV 94.5 - normal since Oct 2021\par  iron sat 27%, normal b12, folate, and MMA\par \par Colonoscopy 2019 with polyps - repeat every 5 years. 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 \par \par Received [employer] and Pfizer booster in Nov 2021\par \par He's active, volunteering with gardening and staying very active. \par  [de-identified] : Patient is seen today for follow up\par \par No new symptoms

## 2022-04-28 ENCOUNTER — APPOINTMENT (OUTPATIENT)
Dept: INTERNAL MEDICINE | Facility: CLINIC | Age: 72
End: 2022-04-28
Payer: MEDICARE

## 2022-04-28 VITALS
HEART RATE: 72 BPM | HEIGHT: 67 IN | SYSTOLIC BLOOD PRESSURE: 118 MMHG | BODY MASS INDEX: 23.54 KG/M2 | DIASTOLIC BLOOD PRESSURE: 60 MMHG | WEIGHT: 150 LBS

## 2022-04-28 PROCEDURE — 36415 COLL VENOUS BLD VENIPUNCTURE: CPT

## 2022-04-28 PROCEDURE — 99214 OFFICE O/P EST MOD 30 MIN: CPT | Mod: 25

## 2022-04-28 NOTE — HISTORY OF PRESENT ILLNESS
[FreeTextEntry1] : here for followup of diabetes, hyperlipidemia\par having active w/u of anemia; started on iron by heme\par found to have fatty liver; told of need for low potassium diet\par needs Pvax 23 in JUly [de-identified] : c/o Non Insulin Dependent Diabetes Mellitus. \par      Denies : Patient denies polyuria, polydipsia and polyphagia.   The patient's weight has CHANGED BY -7\par      The patient has been diagnosed with diabetes since 1995. The frequency of the monitoring schedule is frequently using Freestyle. Hypoglycemic episodes are none known. The results of the last Hbg A1c are 6.7 Jan 2022; 6.8 Oct, 6.8 July, 7.1 Apr, 7.3 Jan 2021; 7.1 Oct, 7.3 Dec, 7.9 Aug,  8.4 Jan 2019;  9.4 Oct, 9.3 July, 11.3 Mar 2018. Side effects of the medications include NONE. Compliance with the medical regimen has been GOOD. \par      c/o Hypercholesterolemia Last LDL 50 Sanjeev\par Patient denies myalgias. \par      The lipid profile goals for the patient are LDL less than 100 if not 70mg/dl. Dietary modifications have included reduced fat intake . Exercise modifications have included participation in walking . Response to the medications has been fair.\par      Signs of abuse or neglect? No. Fall Risk/History of Falling No.

## 2022-04-28 NOTE — PHYSICAL EXAM
[No JVD] : no jugular venous distention [Normal] : normal rate, regular rhythm, normal S1 and S2 and no murmur heard [No Carotid Bruits] : no carotid bruits [No Edema] : there was no peripheral edema [de-identified] : no conjunctival pallor [de-identified] : No S4 [de-identified] : no pallor

## 2022-04-28 NOTE — ASSESSMENT
[FreeTextEntry1] : New anemia- will recheck to verify and check iron, B12, folate studies. To heme if confirmed as doubt gi blood loss with normal colonoscopy two years ago and normal mcv. Last hct 37.5.  Recheck labs.  He will include upper endoscopy with his colonoscopy. He may have anemia of chronic disease now.\par \par Type 2 diabetes mellitus without complications - E11.9 (Primary), Check glu and A1c. Urged to increase efforts at weight loss. Ophtho and foot care discussed AGAIN. Cont meds discussed with patient he will likely need higher doses of his medications. Continue f/u with endo Hellerman. Last A1c was AT GOAL 6.7\par \par Mixed hyperlipidemia - E78.2, Check lipid profile and lft's. Cont meds and low chol diet. Urged to increase physical activity and increase efforts at wt loss. Last LDL was at goal 50\par \par BPH with outlet obstruction likely. Previously d/w pt at length. referred to Dr. Hart of urology.\par \par leukocytosis- it appears he normally runs a high-mirna white count. Normal differential noted. Last WBC normal 7.93\par \par Hyperkalemia– while his potassiums tend to be toward the high end, his last potassium 5.7, was the first time it was elevated. 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  Made need low potassium diet.\par \par \par \par \par

## 2022-05-02 LAB
ALBUMIN SERPL ELPH-MCNC: 4.7 G/DL
ALP BLD-CCNC: 67 U/L
ALT SERPL-CCNC: 27 U/L
ANION GAP SERPL CALC-SCNC: 14 MMOL/L
AST SERPL-CCNC: 59 U/L
BASOPHILS # BLD AUTO: 0.03 K/UL
BASOPHILS NFR BLD AUTO: 0.4 %
BILIRUB SERPL-MCNC: 0.4 MG/DL
BUN SERPL-MCNC: 29 MG/DL
CALCIUM SERPL-MCNC: 10 MG/DL
CHLORIDE SERPL-SCNC: 101 MMOL/L
CHOLEST SERPL-MCNC: 129 MG/DL
CO2 SERPL-SCNC: 25 MMOL/L
CREAT SERPL-MCNC: 0.78 MG/DL
EGFR: 97 ML/MIN/1.73M2
EOSINOPHIL # BLD AUTO: 0.33 K/UL
EOSINOPHIL NFR BLD AUTO: 4.3 %
ESTIMATED AVERAGE GLUCOSE: 160 MG/DL
FOLATE SERPL-MCNC: >20 NG/ML
GLUCOSE SERPL-MCNC: 149 MG/DL
HBA1C MFR BLD HPLC: 7.2 %
HCT VFR BLD CALC: 39.7 %
HDLC SERPL-MCNC: 57 MG/DL
HGB BLD-MCNC: 12.8 G/DL
IMM GRANULOCYTES NFR BLD AUTO: 0.4 %
IRON SATN MFR SERPL: 25 %
IRON SERPL-MCNC: 85 UG/DL
LDLC SERPL CALC-MCNC: 62 MG/DL
LYMPHOCYTES # BLD AUTO: 2.08 K/UL
LYMPHOCYTES NFR BLD AUTO: 27.4 %
MAN DIFF?: NORMAL
MCHC RBC-ENTMCNC: 30.6 PG
MCHC RBC-ENTMCNC: 32.2 GM/DL
MCV RBC AUTO: 95 FL
MONOCYTES # BLD AUTO: 0.5 K/UL
MONOCYTES NFR BLD AUTO: 6.6 %
NEUTROPHILS # BLD AUTO: 4.62 K/UL
NEUTROPHILS NFR BLD AUTO: 60.9 %
NONHDLC SERPL-MCNC: 72 MG/DL
PLATELET # BLD AUTO: 222 K/UL
POTASSIUM SERPL-SCNC: 4.6 MMOL/L
PROT SERPL-MCNC: 6.9 G/DL
RBC # BLD: 4.18 M/UL
RBC # FLD: 12.4 %
SODIUM SERPL-SCNC: 140 MMOL/L
TIBC SERPL-MCNC: 339 UG/DL
TRIGL SERPL-MCNC: 49 MG/DL
UIBC SERPL-MCNC: 254 UG/DL
WBC # FLD AUTO: 7.59 K/UL

## 2022-05-05 ENCOUNTER — RESULT REVIEW (OUTPATIENT)
Age: 72
End: 2022-05-05

## 2022-05-05 ENCOUNTER — APPOINTMENT (OUTPATIENT)
Dept: HEMATOLOGY ONCOLOGY | Facility: CLINIC | Age: 72
End: 2022-05-05
Payer: MEDICARE

## 2022-05-05 VITALS
HEART RATE: 69 BPM | WEIGHT: 152.25 LBS | DIASTOLIC BLOOD PRESSURE: 57 MMHG | SYSTOLIC BLOOD PRESSURE: 97 MMHG | RESPIRATION RATE: 16 BRPM | TEMPERATURE: 96.8 F | BODY MASS INDEX: 23.9 KG/M2 | HEIGHT: 67 IN | OXYGEN SATURATION: 98 %

## 2022-05-05 PROCEDURE — 36415 COLL VENOUS BLD VENIPUNCTURE: CPT

## 2022-05-05 PROCEDURE — 99213 OFFICE O/P EST LOW 20 MIN: CPT | Mod: 25

## 2022-05-05 NOTE — HISTORY OF PRESENT ILLNESS
[de-identified] : Mr. August Moreno is 71 year old male with normocytic anemia here for consultation, referred by Dr. Duglas Correia\par \par Patient with past medical history of  type 2 diabetes who had blood work done with Dr. Colon on 12/13/21 Hgb 11.9 hct 36.1 MCV 94.5 - normal since Oct 2021\par  iron sat 27%, normal b12, folate, and MMA\par \par Colonoscopy 2019 with polyps - repeat every 5 years. 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 \par \par Received [employer] and Pfizer booster in Nov 2021\par \par He's active, volunteering with gardening and staying very active. \par  [de-identified] : Patient is seen today for follow up\par \par He has started taking iron supplement 65 mg  daily since Feb with prune and well tolerated. \par He lives in the city but keeping all his doctors up here in [place].\par Tentatively planning for another EGD/colonoscopy with his GI in the next week or two.

## 2022-05-05 NOTE — ASSESSMENT
[FreeTextEntry1] : #Normocytic anemia (mild)\par Symptoms - mild tired and fatigued. \par No BRBRP, melena, hematuria\par Colonoscopy (2019) with Dr Quan Sparrow- had polyps which were benign. he is trying to schedule a repeat. \par Blood work show mild iron deficiency. Otherwise normal\par We have  not ruled out bone marrow disorders, which would require bone marrow\par Patient has been on iron supplement since Feb 2022 with Hgb stays around 12. \par -Labs are drawn in the office, reviewed, analyzed, and discussed\par -Repeated Ferritin pending - will consider IV iron if his Ferritin dropped lower than the previous 43. \par -patient to go call back in a few days to go over result. \par -he'll do do blood work with Dr. Colon every 3 months and will reach out to us if his h/h decreased. \par \par #Transaminitis\par Elevated AST\par Denies alcohol intake\par USG shows fatty liver - follows by Dr. Antonio\par \par Social\par Lives with wife and daughter in [place]\par Has a cat\par Work - Retired.  with [employer]\par \par Patient had multiple questions which were answered to satisfaction\par \par d/w Dr. Watts \par Follow up in 6-8 months or earlier if any problems arise. \par CBC, CMP, iron studies, ferritin\par

## 2022-05-05 NOTE — CONSULT LETTER
[Dear  ___] : Dear  [unfilled], [Consult Letter:] : I had the pleasure of evaluating your patient, [unfilled]. [Please see my note below.] : Please see my note below. [Consult Closing:] : Thank you very much for allowing me to participate in the care of this patient.  If you have any questions, please do not hesitate to contact me. [Sincerely,] : Sincerely, [FreeTextEntry3] : Cristian Watts MD, MPH\par  of Medicine [facility] at [facility]\par Attending Physician \par Hematology and Medical Oncology\par [facility]\par

## 2022-05-09 LAB — METHYLMALONATE SERPL-SCNC: 205 NMOL/L

## 2022-05-10 ENCOUNTER — TRANSCRIPTION ENCOUNTER (OUTPATIENT)
Age: 72
End: 2022-05-10

## 2022-06-01 ENCOUNTER — APPOINTMENT (OUTPATIENT)
Dept: ENDOCRINOLOGY | Facility: CLINIC | Age: 72
End: 2022-06-01
Payer: MEDICARE

## 2022-06-01 PROCEDURE — 99214 OFFICE O/P EST MOD 30 MIN: CPT | Mod: 95

## 2022-06-01 RX ORDER — INSULIN GLARGINE 100 [IU]/ML
100 INJECTION, SOLUTION SUBCUTANEOUS
Qty: 9 | Refills: 3 | Status: ACTIVE | COMMUNITY
Start: 1900-01-01 | End: 1900-01-01

## 2022-06-03 NOTE — HISTORY OF PRESENT ILLNESS
[Home] : at home, [unfilled] , at the time of the visit. [Medical Office: ([facility])___] : at the medical office located in  [Verbal consent obtained from patient] : the patient, [unfilled] [FreeTextEntry1] : Jun 01, 2022       [employer]\par \par PCP:  Dr. Abdi Colon\par           Eyes:  Dr. Kenisha malave recently \par           GI: [facility] - up to date\par \par \par CC:  Diabetes (~age 47)\par \par Retired . \par \par Apr, 2019   8.4%\par Jan 23, 2021 A1c 7.3 % \par Aug 9, 2021  A1c 6.8 %\par Oct 21, 2021  A1c 6.8 %\par April 22, 2022  A1c 7.2 % \par \par Remains on:\par \par On Victoza 1.8 daily\par metformin 1000 BID\par Lantus 10- 12 units HS \par \par Imp/Plan:  He continues to find CGM:  Freestyle Colin helpful in monitoring his sugars and his sugars have been in good range.  \par  \par He knows ADA guideline is for A1c up to 7%\par He will continue same Rx, activity, lower carb diet.   ROV in several months\par \par \par Dec 01, 2021     \par \par PCP:  Dr. Abdi Colon\par           Eyes:  Dr. Kenisha malave recently \par           GI: [facility] - up to date\par \par \par CC:  Diabetes (~age 47)\par \par Retired . \par \par Most recent A1c levels. \par Jan 23, 2021 A1c 7.3 % \par Aug 9, 2021  A1c 6.8 %\par Oct 21, 2021  A1c 6.8 %\par \par Remains on:\par \par On Victoza 1.8 daily\par metformin 1000 BID\par Lantus 10- 12 units HS \par \par Humalog BID - TID AC by sliding scale:  none if sugar under 140;  3 units if BS over 140.  \par Checking sugars at least 4X a day. \par \par Needs more Colin 2 sensors.   Can put deep on his android phone.\par \par No symptomatic hypoglycemia although down to 70-80    \par \par Impression:  BS and A1c in good range on current Rx with attention to diet, activity.\par \par Plan:  Same Rx\par \par \par \par Sep 03, 2021        Amwell TEB \par \par  PCP:  Dr. Abdi Colon\par           Eyes:  Dr. Kenisha malave recently \par           GI: [facility] - up to date\par \par \par CC:  Diabetes (~age 47)\par \par Retired . \par \par Most recent A1c levels. \par Jan 23, 2021 A1c 7.3 % \par Aug 9, 2021  A1c 6.8 %\par \par On Victoza 1.8 daily\par metformin 1000 BID\par Lantus 10- 12 units HS \par \par Humalog BID - TID AC by sliding scale:  none if sugar under 140;  3 units if BS over 140.  \par Checking sugars at least 4X a day. \par \par Impression:  \par A1c improving.\par He continues to find the Freestyle Colin 14 helpful.  \par \par Plan:  Same Rx.\par I have asked him to send me images from the Colin 14 for review. \par ROV by January 2022\par \par \par 7/22/2021  labs included 6.8 %\par \par Feb 11, 2021    in person\par \par  PCP:  Dr. Abdi Colon\par           Eyes:  Dr. Kenisha Krueger saw recently \par           GI: [facility] - up to date\par \par \par CC:  Diabetes (~age 47)\par \par Jan 23, 2021 A1c 7.3 %  (lower when he was in [place])    Now less active in [place] apartment.  \par On Victoza 1.8 daily\par Lantus 12 units HS \par metformin 1000 BID\par Humalog BID - TID AC by sliding scale\par \par Continues to find the Freestyle Colin 14 very helpful\par \par Data reviewed.   If he has chili over rice, BS goes up to 250 mg/dl\par \par Impression:  Working hard to control sugars.\par He has good understanding of relatin between his diet and resultsing sugars.\par \par Plan:  Reviewed ADA gudielines.\par Same Rx\par \par \par \par \par \par \par Nov 03, 2020       Android    [employer]\par \par  PCP:  Dr. Abdi Colon\par           Eyes:  Dr. De - needs to see\par           GI: [facility] - saw about a year ago\par \par \par CC:  Diabetes (~age 47)\par \par Was out of town  and A1c went down to 7.1 % from 7.3 in Dec 2019, 7.9 in Augu 2019 and 8.4 in April 2019\par He attributes much of the improvement to multiple tests he can do with Freestyle Colin 14, more than 4X a day.\par \par Has been able to lower Lantus to 12 units a day HS.\par Humalog by sliding scale. 2- 3 times a day, none if glucose   \par \par Impression:  BS at visit to Dr. Colon was over 200 mg/dl after a sandwich; however, A1c trajectory is improving.\par \par Plan:   Same Rx and ROV in February 2021.        \par \par \par \par \par \par Sanjeev 15, 2020       Allscripts broken\par \par PCP:  Dr. Abdi Colon\par           Eyes:  Dr. De - needs to see\par           GI: [facility] - saw about a year ago\par \par \par CC:  Diabetes (~age 47)\par \par December 3, 2019  A1c 7.3 % (had been 8.4 in April 2019)\par Monitoring blood sugars at least 4X a day.\par Using Colin 14 CGM which he reports has been very helpful.\par Data reviewed still shows some spikes after meals\par He has been able to lower dose of Lantus from 32 units in PM, now down to about\par 16 units.\par Also remains on Victoza.\par ^Humalog at least twice a day before meals also assists in blunting rise in sugar.^\par ^Injects insulin 3X a day.^\par \par Impression:  Has good understanding of needed strategies to continue to control diabetes.\par Plans to see ophthalmology.\par ROV in May or June\par \par \par \par August 13, 2019\par \par PCP:  Dr. Abdi Colon\par           Eyes:  Dr. De\par \par \par CC:  Diabetes\par \par ^  Monitoring fingerstick BS at home at least 4 times a day and taking 3 shots of insulin.\par He notes occasional low blood sugar, particularly before breakfast.\par It is likely that his closer attention to diet and activity has decreased his insulin\par requirements.  So he will start to slowly taper the Lantus dose down from 32 units.\par \par Impression:  Based on his information, he still has wide fluctuations in blood glucose, although less so\par than previously.   He would be excellent candidate for CGM with Colin 14 and that is in the works.\par He will have A1c today.    I asked him to return in January and to see Dr. Colon in the interval.  \par \par \par \par February 26, 2019\par \par  PCP:   Dr. Abdi Colon\par             Eyes:   saw  [facility] opthal several months\par             GI:  recent colonoscopy - [facility]\par             \par            .\par            CC: Diabetes (~46 yo)\par \par Last A1c 8.4 %\par He would be an excellent candidate for CGM:  Freestyle Colin as he has widely fluctuating blood sugars,\par checks his fingerstick 4 times a day and injects at least 3 shots of insulin day. \par \par Previous notes from [employer] appended below.\par He was a  for [employer] and retired about a year ago.  \par As noted by Dr. Colon, he is now residing in [place].  \par ^He is testing his fingerstick blood sugars at least 4 times a day with his Accu-meter \par Taking Lantus 32 units  at bedtime and   +++\par Humalog 3 units before breakfast and 3 units before dinner if blood glucose is over 150 mg/dl\par           Victoza 1.8                ++\par            Metformin 1000 BID   ++\par He notes occasional hypoglycemia - especially before breakfast.\par He continues to note wide fluctuations in his sugars.^   \par Recent January 8, 2019 HbA1c was 8.4 % \par \par Impression:  He would be a good candidate for CGM with a Freestyle Colin meter.\par \par Plan:  Refills on Humalog insulin today.  \par Continue same dose of Lantus.  \par \par - \par \par  March 26, 2018\par            .\par            PCP: Moved from [facility] - Dr. Jackelyn Lincoln\par             Eyes \par            .\par            CC: Diabetes\par            .\par            68 yo father of five, reitred from [employer].\par            Diagnosed with Type 2 diabetes about 20 years ago.\par            Oriignally on just oral agents, then insulin added.\par            .\par            Takes Lantus 28 units HS\par            Victoaza 1.8\par            MF 1000 BID\par            Simvastatin 40\par            B12 \par            ASA 81\par            One Touch Ultraa 2\par            .\par            Impression: Apparently diabetes is currently under loose contol.\par            .\par            Plan: Test fingerstick BS 4X a day: fasting and one hour after each meal. May add short acting insulin before meals. \par            To Dermatology b/o finger lesion.\par            Will need local PCP. \par

## 2022-07-28 ENCOUNTER — APPOINTMENT (OUTPATIENT)
Dept: INTERNAL MEDICINE | Facility: CLINIC | Age: 72
End: 2022-07-28

## 2022-07-28 VITALS
BODY MASS INDEX: 23.23 KG/M2 | HEIGHT: 67 IN | SYSTOLIC BLOOD PRESSURE: 100 MMHG | DIASTOLIC BLOOD PRESSURE: 50 MMHG | WEIGHT: 148 LBS | HEART RATE: 64 BPM

## 2022-07-28 DIAGNOSIS — K76.0 FATTY (CHANGE OF) LIVER, NOT ELSEWHERE CLASSIFIED: ICD-10-CM

## 2022-07-28 PROCEDURE — 99214 OFFICE O/P EST MOD 30 MIN: CPT | Mod: 25

## 2022-07-28 PROCEDURE — 36415 COLL VENOUS BLD VENIPUNCTURE: CPT

## 2022-07-28 RX ORDER — METFORMIN HYDROCHLORIDE 1000 MG/1
1000 TABLET, COATED ORAL
Qty: 180 | Refills: 3 | Status: ACTIVE | COMMUNITY
Start: 2019-07-15 | End: 1900-01-01

## 2022-07-28 NOTE — ASSESSMENT
[FreeTextEntry1] : New anemia- will recheck to verify and check iron, B12, folate studies. To heme if confirmed as doubt gi blood loss with normal colonoscopy two years ago and normal mcv. Last hct STABLE 35.6 Recheck labs.  He may have anemia of chronic disease now.\par \par Type 2 diabetes mellitus without complications - E11.9 (Primary), Check glu and A1c. Urged to increase efforts at weight loss. Ophtho and foot care discussed AGAIN. Cont meds discussed with patient he will likely need higher doses of his medications. Continue f/u with endo Hellerman. Last A1c was ALMOST AT GOAL 7.2.  Will decrease lisinopril to 5 mg daily as it is for renal protection not hypertension.\par \par Mixed hyperlipidemia - E78.2, Check lipid profile and lft's. Cont meds and low chol diet. Urged to increase physical activity and increase efforts at wt loss. Last LDL was at goal 62\par \par BPH with outlet obstruction likely. Previously d/w pt at length. referred to Dr. Hart of urology.\par \par leukocytosis- it appears he normally runs a high-mirna white count. Normal differential noted. Last WBC normal 7.46\par \par Hyperkalemia– while his potassiums tend to be toward the high end, his last potassium 5.0. 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  Made need low potassium diet.\par \par \par \par \par

## 2022-07-28 NOTE — PLAN
[FreeTextEntry1] : Check labs and continue meds\par Return 3 months\par Decrease lisinopril to 5 mg daily

## 2022-07-28 NOTE — PHYSICAL EXAM
[No JVD] : no jugular venous distention [Normal] : normal rate, regular rhythm, normal S1 and S2 and no murmur heard [No Carotid Bruits] : no carotid bruits [No Edema] : there was no peripheral edema [de-identified] : no conjunctival pallor [de-identified] : no pallor

## 2022-08-01 LAB
ALBUMIN SERPL ELPH-MCNC: 4.9 G/DL
ALP BLD-CCNC: 64 U/L
ALT SERPL-CCNC: 26 U/L
ANION GAP SERPL CALC-SCNC: 11 MMOL/L
AST SERPL-CCNC: 57 U/L
BASOPHILS # BLD AUTO: 0.09 K/UL
BASOPHILS NFR BLD AUTO: 0.9 %
BILIRUB SERPL-MCNC: 0.4 MG/DL
BUN SERPL-MCNC: 21 MG/DL
CALCIUM SERPL-MCNC: 9.8 MG/DL
CHLORIDE SERPL-SCNC: 104 MMOL/L
CHOLEST SERPL-MCNC: 143 MG/DL
CO2 SERPL-SCNC: 26 MMOL/L
CREAT SERPL-MCNC: 0.86 MG/DL
EGFR: 93 ML/MIN/1.73M2
EOSINOPHIL # BLD AUTO: 0.76 K/UL
EOSINOPHIL NFR BLD AUTO: 7.5 %
ESTIMATED AVERAGE GLUCOSE: 148 MG/DL
GLUCOSE SERPL-MCNC: 107 MG/DL
HBA1C MFR BLD HPLC: 6.8 %
HCT VFR BLD CALC: 38.6 %
HDLC SERPL-MCNC: 66 MG/DL
HGB BLD-MCNC: 13 G/DL
IMM GRANULOCYTES NFR BLD AUTO: 0.2 %
IRON SATN MFR SERPL: 31 %
IRON SERPL-MCNC: 110 UG/DL
LDLC SERPL CALC-MCNC: 66 MG/DL
LYMPHOCYTES # BLD AUTO: 2.01 K/UL
LYMPHOCYTES NFR BLD AUTO: 19.9 %
MAN DIFF?: NORMAL
MCHC RBC-ENTMCNC: 31.4 PG
MCHC RBC-ENTMCNC: 33.7 GM/DL
MCV RBC AUTO: 93.2 FL
MONOCYTES # BLD AUTO: 0.62 K/UL
MONOCYTES NFR BLD AUTO: 6.1 %
NEUTROPHILS # BLD AUTO: 6.59 K/UL
NEUTROPHILS NFR BLD AUTO: 65.4 %
NONHDLC SERPL-MCNC: 77 MG/DL
PLATELET # BLD AUTO: 184 K/UL
POTASSIUM SERPL-SCNC: 4.9 MMOL/L
PROT SERPL-MCNC: 6.9 G/DL
RBC # BLD: 4.14 M/UL
RBC # FLD: 12.5 %
SODIUM SERPL-SCNC: 140 MMOL/L
TIBC SERPL-MCNC: 352 UG/DL
TRIGL SERPL-MCNC: 57 MG/DL
UIBC SERPL-MCNC: 242 UG/DL
WBC # FLD AUTO: 10.09 K/UL

## 2022-10-11 ENCOUNTER — APPOINTMENT (OUTPATIENT)
Dept: INTERNAL MEDICINE | Facility: CLINIC | Age: 72
End: 2022-10-11

## 2022-10-11 VITALS
HEART RATE: 66 BPM | SYSTOLIC BLOOD PRESSURE: 118 MMHG | WEIGHT: 154 LBS | HEIGHT: 67 IN | DIASTOLIC BLOOD PRESSURE: 50 MMHG | BODY MASS INDEX: 24.17 KG/M2

## 2022-10-11 DIAGNOSIS — D64.9 ANEMIA, UNSPECIFIED: ICD-10-CM

## 2022-10-11 DIAGNOSIS — Z23 ENCOUNTER FOR IMMUNIZATION: ICD-10-CM

## 2022-10-11 DIAGNOSIS — E11.9 TYPE 2 DIABETES MELLITUS W/OUT COMPLICATIONS: ICD-10-CM

## 2022-10-11 DIAGNOSIS — D72.829 ELEVATED WHITE BLOOD CELL COUNT, UNSPECIFIED: ICD-10-CM

## 2022-10-11 DIAGNOSIS — E61.1 IRON DEFICIENCY: ICD-10-CM

## 2022-10-11 DIAGNOSIS — E78.2 MIXED HYPERLIPIDEMIA: ICD-10-CM

## 2022-10-11 PROCEDURE — G0008: CPT

## 2022-10-11 PROCEDURE — 99214 OFFICE O/P EST MOD 30 MIN: CPT | Mod: 25

## 2022-10-11 PROCEDURE — 36415 COLL VENOUS BLD VENIPUNCTURE: CPT

## 2022-10-11 PROCEDURE — 90662 IIV NO PRSV INCREASED AG IM: CPT

## 2022-10-11 NOTE — ASSESSMENT
[FreeTextEntry1] : New anemia- will recheck to verify and check iron, B12, folate studies. To heme if confirmed as doubt gi blood loss with normal colonoscopy two years ago and normal mcv. Last hct STABLE 38.6 Recheck labs.  He may have anemia of chronic disease now.\par \par Type 2 diabetes mellitus without complications - E11.9 (Primary), Check glu and A1c. Urged to increase efforts at weight loss. Ophtho and foot care discussed AGAIN. Cont meds discussed with patient he will likely need higher doses of his medications. Continue f/u with endo Hellerman. Last A1c was AT GOAL 6.8. We decreased lisinopril to 5 mg daily as it was for renal protection, not hypertension.\par \par Mixed hyperlipidemia - E78.2, Check lipid profile and lft's. Cont meds and low chol diet. Urged to increase physical activity and increase efforts at wt loss. Last LDL was at goal 62\par \par BPH with outlet obstruction likely. Previously d/w pt at length. referred to Dr. Hart of urology.\par \par leukocytosis- it appears he normally runs a high-mirna white count. Normal differential noted. 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  Made need low potassium diet.\par while his potassiums tend to be toward the high end, his last potassium 4.9.\par \par Risks/benefits of Flu vaccine d/w patient and patient understands and accepts. \par \par \par \par

## 2022-10-11 NOTE — HISTORY OF PRESENT ILLNESS
[FreeTextEntry1] : here for followup of diabetes, hyperlipidemia\par having active w/u of anemia; started on iron by heme\par found to have fatty liver; told of need for low potassium diet\par needs Pvax 23 in [place] [de-identified] : c/o Non Insulin Dependent Diabetes Mellitus. \par      Denies : Patient denies polyuria, polydipsia and polyphagia.   The patient's weight has CHANGED BY -4\par      The patient has been diagnosed with diabetes since 1995. The frequency of the monitoring schedule is frequently using Freestyle. Hypoglycemic episodes are none known. The results of the last Hbg A1c are 6.8 July, 7.2 Apr,  6.7 Jan 2022; 6.8 Oct, 6.8 July, 7.1 Apr, 7.3 Sanjeev 2021; 7.1 Oct, 7.3 Dec, 7.9 Aug,  8.4 Jan 2019;  9.4 Oct, 9.3 July, 11.3 Mar 2018. Side effects of the medications include NONE. Compliance with the medical regimen has been GOOD. \par      c/o Hypercholesterolemia Last LDL 66 July\par Patient denies myalgias. \par      The lipid profile goals for the patient are LDL less than 100 if not 70mg/dl. Dietary modifications have included reduced fat intake . Exercise modifications have included participation in walking . Response to the medications has been fair.\par      Signs of abuse or neglect? No. Fall Risk/History of Falling No.

## 2022-10-11 NOTE — PHYSICAL EXAM
[No JVD] : no jugular venous distention [Normal] : normal rate, regular rhythm, normal S1 and S2 and no murmur heard [No Carotid Bruits] : no carotid bruits [No Edema] : there was no peripheral edema [de-identified] : no conjunctival pallor [de-identified] : no pallor

## 2022-10-12 LAB
ALBUMIN SERPL ELPH-MCNC: 4.7 G/DL
ALP BLD-CCNC: 59 U/L
ALT SERPL-CCNC: 27 U/L
ANION GAP SERPL CALC-SCNC: 11 MMOL/L
AST SERPL-CCNC: 63 U/L
BASOPHILS # BLD AUTO: 0.06 K/UL
BASOPHILS NFR BLD AUTO: 0.7 %
BILIRUB SERPL-MCNC: 0.4 MG/DL
BUN SERPL-MCNC: 21 MG/DL
CALCIUM SERPL-MCNC: 10 MG/DL
CHLORIDE SERPL-SCNC: 102 MMOL/L
CHOLEST SERPL-MCNC: 142 MG/DL
CO2 SERPL-SCNC: 28 MMOL/L
CREAT SERPL-MCNC: 0.84 MG/DL
EGFR: 94 ML/MIN/1.73M2
EOSINOPHIL # BLD AUTO: 0.38 K/UL
EOSINOPHIL NFR BLD AUTO: 4.3 %
ESTIMATED AVERAGE GLUCOSE: 154 MG/DL
GLUCOSE SERPL-MCNC: 107 MG/DL
HBA1C MFR BLD HPLC: 7 %
HCT VFR BLD CALC: 38.1 %
HDLC SERPL-MCNC: 61 MG/DL
HGB BLD-MCNC: 12.8 G/DL
IMM GRANULOCYTES NFR BLD AUTO: 0.2 %
IRON SATN MFR SERPL: 25 %
IRON SERPL-MCNC: 86 UG/DL
LDLC SERPL CALC-MCNC: 64 MG/DL
LYMPHOCYTES # BLD AUTO: 2.4 K/UL
LYMPHOCYTES NFR BLD AUTO: 27.5 %
MAN DIFF?: NORMAL
MCHC RBC-ENTMCNC: 31.7 PG
MCHC RBC-ENTMCNC: 33.6 GM/DL
MCV RBC AUTO: 94.3 FL
MONOCYTES # BLD AUTO: 0.6 K/UL
MONOCYTES NFR BLD AUTO: 6.9 %
NEUTROPHILS # BLD AUTO: 5.28 K/UL
NEUTROPHILS NFR BLD AUTO: 60.4 %
NONHDLC SERPL-MCNC: 81 MG/DL
PLATELET # BLD AUTO: 178 K/UL
POTASSIUM SERPL-SCNC: 5 MMOL/L
PROT SERPL-MCNC: 6.8 G/DL
RBC # BLD: 4.04 M/UL
RBC # FLD: 13 %
SODIUM SERPL-SCNC: 141 MMOL/L
TIBC SERPL-MCNC: 340 UG/DL
TRIGL SERPL-MCNC: 81 MG/DL
UIBC SERPL-MCNC: 254 UG/DL
WBC # FLD AUTO: 8.74 K/UL

## 2022-11-17 ENCOUNTER — APPOINTMENT (OUTPATIENT)
Dept: HEMATOLOGY ONCOLOGY | Facility: CLINIC | Age: 72
End: 2022-11-17

## 2023-01-03 ENCOUNTER — TRANSCRIPTION ENCOUNTER (OUTPATIENT)
Age: 73
End: 2023-01-03

## 2023-01-04 ENCOUNTER — TRANSCRIPTION ENCOUNTER (OUTPATIENT)
Age: 73
End: 2023-01-04

## 2023-01-06 RX ORDER — SIMVASTATIN 40 MG/1
40 TABLET, FILM COATED ORAL
Qty: 90 | Refills: 0 | Status: ACTIVE | COMMUNITY
Start: 2019-05-07 | End: 1900-01-01

## 2023-01-06 RX ORDER — LISINOPRIL 5 MG/1
5 TABLET ORAL
Qty: 90 | Refills: 1 | Status: ACTIVE | COMMUNITY
Start: 2019-04-15 | End: 1900-01-01

## 2023-02-08 ENCOUNTER — APPOINTMENT (OUTPATIENT)
Dept: INTERNAL MEDICINE | Facility: CLINIC | Age: 73
End: 2023-02-08

## 2023-06-07 ENCOUNTER — APPOINTMENT (OUTPATIENT)
Dept: ENDOCRINOLOGY | Facility: CLINIC | Age: 73
End: 2023-06-07